# Patient Record
Sex: FEMALE | Race: WHITE | NOT HISPANIC OR LATINO | URBAN - METROPOLITAN AREA
[De-identification: names, ages, dates, MRNs, and addresses within clinical notes are randomized per-mention and may not be internally consistent; named-entity substitution may affect disease eponyms.]

---

## 2017-01-31 ENCOUNTER — OUTPATIENT (OUTPATIENT)
Dept: OUTPATIENT SERVICES | Facility: HOSPITAL | Age: 27
LOS: 1 days | End: 2017-01-31
Payer: COMMERCIAL

## 2017-01-31 DIAGNOSIS — Z01.818 ENCOUNTER FOR OTHER PREPROCEDURAL EXAMINATION: ICD-10-CM

## 2017-01-31 DIAGNOSIS — O34.211 MATERNAL CARE FOR LOW TRANSVERSE SCAR FROM PREVIOUS CESAREAN DELIVERY: ICD-10-CM

## 2017-01-31 DIAGNOSIS — Z98.89 OTHER SPECIFIED POSTPROCEDURAL STATES: Chronic | ICD-10-CM

## 2017-01-31 LAB
ANTIBODY ID 1_1: SIGNIFICANT CHANGE UP
BLD GP AB SCN SERPL QL: POSITIVE — SIGNIFICANT CHANGE UP
HCT VFR BLD CALC: 38.5 % — SIGNIFICANT CHANGE UP (ref 34.5–45)
HGB BLD-MCNC: 12.9 G/DL — SIGNIFICANT CHANGE UP (ref 11.5–15.5)
MCHC RBC-ENTMCNC: 31 PG — SIGNIFICANT CHANGE UP (ref 27–34)
MCHC RBC-ENTMCNC: 33.5 GM/DL — SIGNIFICANT CHANGE UP (ref 32–36)
MCV RBC AUTO: 92.5 FL — SIGNIFICANT CHANGE UP (ref 80–100)
PLATELET # BLD AUTO: 198 K/UL — SIGNIFICANT CHANGE UP (ref 150–400)
RBC # BLD: 4.16 M/UL — SIGNIFICANT CHANGE UP (ref 3.8–5.2)
RBC # FLD: 14.1 % — SIGNIFICANT CHANGE UP (ref 10.3–14.5)
RH IG SCN BLD-IMP: NEGATIVE — SIGNIFICANT CHANGE UP
WBC # BLD: 8.93 K/UL — SIGNIFICANT CHANGE UP (ref 3.8–10.5)
WBC # FLD AUTO: 8.93 K/UL — SIGNIFICANT CHANGE UP (ref 3.8–10.5)

## 2017-01-31 PROCEDURE — 86850 RBC ANTIBODY SCREEN: CPT

## 2017-01-31 PROCEDURE — 86900 BLOOD TYPING SEROLOGIC ABO: CPT

## 2017-01-31 PROCEDURE — G0463: CPT

## 2017-01-31 PROCEDURE — 85027 COMPLETE CBC AUTOMATED: CPT

## 2017-01-31 PROCEDURE — 86901 BLOOD TYPING SEROLOGIC RH(D): CPT

## 2017-01-31 PROCEDURE — 86870 RBC ANTIBODY IDENTIFICATION: CPT

## 2017-01-31 PROCEDURE — 86077 PHYS BLOOD BANK SERV XMATCH: CPT

## 2017-01-31 RX ORDER — SODIUM CHLORIDE 9 MG/ML
1000 INJECTION, SOLUTION INTRAVENOUS
Qty: 0 | Refills: 0 | Status: DISCONTINUED | OUTPATIENT
Start: 2017-02-01 | End: 2017-02-04

## 2017-01-31 RX ORDER — METOCLOPRAMIDE HCL 10 MG
10 TABLET ORAL ONCE
Qty: 0 | Refills: 0 | Status: DISCONTINUED | OUTPATIENT
Start: 2017-02-01 | End: 2017-02-04

## 2017-02-01 ENCOUNTER — TRANSCRIPTION ENCOUNTER (OUTPATIENT)
Age: 27
End: 2017-02-01

## 2017-02-01 ENCOUNTER — INPATIENT (INPATIENT)
Facility: HOSPITAL | Age: 27
LOS: 2 days | Discharge: HOME | End: 2017-02-04
Payer: COMMERCIAL

## 2017-02-01 VITALS — HEIGHT: 63 IN | WEIGHT: 158.73 LBS

## 2017-02-01 DIAGNOSIS — O34.211 MATERNAL CARE FOR LOW TRANSVERSE SCAR FROM PREVIOUS CESAREAN DELIVERY: ICD-10-CM

## 2017-02-01 DIAGNOSIS — Z01.818 ENCOUNTER FOR OTHER PREPROCEDURAL EXAMINATION: ICD-10-CM

## 2017-02-01 DIAGNOSIS — Z98.89 OTHER SPECIFIED POSTPROCEDURAL STATES: Chronic | ICD-10-CM

## 2017-02-01 LAB — T PALLIDUM AB TITR SER: NEGATIVE — SIGNIFICANT CHANGE UP

## 2017-02-01 RX ORDER — SODIUM CHLORIDE 9 MG/ML
1000 INJECTION, SOLUTION INTRAVENOUS ONCE
Qty: 0 | Refills: 0 | Status: COMPLETED | OUTPATIENT
Start: 2017-02-01 | End: 2017-02-01

## 2017-02-01 RX ORDER — CEFAZOLIN SODIUM 1 G
2000 VIAL (EA) INJECTION ONCE
Qty: 0 | Refills: 0 | Status: COMPLETED | OUTPATIENT
Start: 2017-02-01 | End: 2017-02-01

## 2017-02-01 RX ORDER — IBUPROFEN 200 MG
600 TABLET ORAL EVERY 6 HOURS
Qty: 0 | Refills: 0 | Status: DISCONTINUED | OUTPATIENT
Start: 2017-02-03 | End: 2017-02-04

## 2017-02-01 RX ORDER — ACETAMINOPHEN 500 MG
975 TABLET ORAL EVERY 6 HOURS
Qty: 0 | Refills: 0 | Status: DISCONTINUED | OUTPATIENT
Start: 2017-02-01 | End: 2017-02-04

## 2017-02-01 RX ORDER — HEPARIN SODIUM 5000 [USP'U]/ML
5000 INJECTION INTRAVENOUS; SUBCUTANEOUS EVERY 12 HOURS
Qty: 0 | Refills: 0 | Status: DISCONTINUED | OUTPATIENT
Start: 2017-02-01 | End: 2017-02-04

## 2017-02-01 RX ORDER — ONDANSETRON 8 MG/1
4 TABLET, FILM COATED ORAL EVERY 6 HOURS
Qty: 0 | Refills: 0 | Status: DISCONTINUED | OUTPATIENT
Start: 2017-02-01 | End: 2017-02-03

## 2017-02-01 RX ORDER — GLYCERIN ADULT
1 SUPPOSITORY, RECTAL RECTAL AT BEDTIME
Qty: 0 | Refills: 0 | Status: DISCONTINUED | OUTPATIENT
Start: 2017-02-01 | End: 2017-02-04

## 2017-02-01 RX ORDER — CITRIC ACID/SODIUM CITRATE 300-500 MG
15 SOLUTION, ORAL ORAL ONCE
Qty: 0 | Refills: 0 | Status: COMPLETED | OUTPATIENT
Start: 2017-02-01 | End: 2017-02-01

## 2017-02-01 RX ORDER — FERROUS SULFATE 325(65) MG
325 TABLET ORAL DAILY
Qty: 0 | Refills: 0 | Status: DISCONTINUED | OUTPATIENT
Start: 2017-02-01 | End: 2017-02-04

## 2017-02-01 RX ORDER — TETANUS TOXOID, REDUCED DIPHTHERIA TOXOID AND ACELLULAR PERTUSSIS VACCINE, ADSORBED 5; 2.5; 8; 8; 2.5 [IU]/.5ML; [IU]/.5ML; UG/.5ML; UG/.5ML; UG/.5ML
0.5 SUSPENSION INTRAMUSCULAR ONCE
Qty: 0 | Refills: 0 | Status: DISCONTINUED | OUTPATIENT
Start: 2017-02-01 | End: 2017-02-04

## 2017-02-01 RX ORDER — NALOXONE HYDROCHLORIDE 4 MG/.1ML
0.1 SPRAY NASAL
Qty: 0 | Refills: 0 | Status: DISCONTINUED | OUTPATIENT
Start: 2017-02-01 | End: 2017-02-03

## 2017-02-01 RX ORDER — SODIUM CHLORIDE 9 MG/ML
1000 INJECTION, SOLUTION INTRAVENOUS
Qty: 0 | Refills: 0 | Status: DISCONTINUED | OUTPATIENT
Start: 2017-02-01 | End: 2017-02-04

## 2017-02-01 RX ORDER — OXYTOCIN 10 UNIT/ML
41.67 VIAL (ML) INJECTION
Qty: 20 | Refills: 0 | Status: DISCONTINUED | OUTPATIENT
Start: 2017-02-01 | End: 2017-02-01

## 2017-02-01 RX ORDER — FAMOTIDINE 10 MG/ML
20 INJECTION INTRAVENOUS ONCE
Qty: 0 | Refills: 0 | Status: COMPLETED | OUTPATIENT
Start: 2017-02-01 | End: 2017-02-01

## 2017-02-01 RX ORDER — DIPHENHYDRAMINE HCL 50 MG
25 CAPSULE ORAL EVERY 6 HOURS
Qty: 0 | Refills: 0 | Status: DISCONTINUED | OUTPATIENT
Start: 2017-02-01 | End: 2017-02-04

## 2017-02-01 RX ORDER — SIMETHICONE 80 MG/1
80 TABLET, CHEWABLE ORAL EVERY 4 HOURS
Qty: 0 | Refills: 0 | Status: DISCONTINUED | OUTPATIENT
Start: 2017-02-01 | End: 2017-02-04

## 2017-02-01 RX ORDER — OXYTOCIN 10 UNIT/ML
333.33 VIAL (ML) INJECTION
Qty: 20 | Refills: 0 | Status: DISCONTINUED | OUTPATIENT
Start: 2017-02-01 | End: 2017-02-02

## 2017-02-01 RX ORDER — KETOROLAC TROMETHAMINE 30 MG/ML
30 SYRINGE (ML) INJECTION EVERY 6 HOURS
Qty: 0 | Refills: 0 | Status: DISCONTINUED | OUTPATIENT
Start: 2017-02-01 | End: 2017-02-04

## 2017-02-01 RX ORDER — LANOLIN
1 OINTMENT (GRAM) TOPICAL
Qty: 0 | Refills: 0 | Status: DISCONTINUED | OUTPATIENT
Start: 2017-02-01 | End: 2017-02-04

## 2017-02-01 RX ORDER — DOCUSATE SODIUM 100 MG
100 CAPSULE ORAL
Qty: 0 | Refills: 0 | Status: DISCONTINUED | OUTPATIENT
Start: 2017-02-01 | End: 2017-02-04

## 2017-02-01 RX ADMIN — Medication 100 MILLIGRAM(S): at 12:46

## 2017-02-01 RX ADMIN — SODIUM CHLORIDE 2000 MILLILITER(S): 9 INJECTION, SOLUTION INTRAVENOUS at 10:50

## 2017-02-01 RX ADMIN — FAMOTIDINE 20 MILLIGRAM(S): 10 INJECTION INTRAVENOUS at 11:21

## 2017-02-01 RX ADMIN — Medication 15 MILLILITER(S): at 11:21

## 2017-02-01 RX ADMIN — Medication 30 MILLIGRAM(S): at 13:39

## 2017-02-01 RX ADMIN — HEPARIN SODIUM 5000 UNIT(S): 5000 INJECTION INTRAVENOUS; SUBCUTANEOUS at 20:34

## 2017-02-01 RX ADMIN — SODIUM CHLORIDE 125 MILLILITER(S): 9 INJECTION, SOLUTION INTRAVENOUS at 14:27

## 2017-02-01 RX ADMIN — Medication 1000 MILLIUNIT(S)/MIN: at 14:30

## 2017-02-01 RX ADMIN — SIMETHICONE 80 MILLIGRAM(S): 80 TABLET, CHEWABLE ORAL at 20:44

## 2017-02-01 NOTE — DISCHARGE NOTE OB - MEDICATION SUMMARY - MEDICATIONS TO STOP TAKING
I will STOP taking the medications listed below when I get home from the hospital:    folic acid 1 mg oral tablet  -- 1 tab(s) by mouth once a day    Pepcid 20 mg oral tablet  -- 1 tab(s) by mouth 2 times a day, As Needed

## 2017-02-01 NOTE — DISCHARGE NOTE OB - CARE PROVIDER_API CALL
Tiffanie Hernadez), Obstetrics and Gynecology  [address] [place] [address]  Phone: [phone]  Fax: [phone]

## 2017-02-01 NOTE — DISCHARGE NOTE OB - HOSPITAL COURSE
27 y/o W/F  at 39 weeks with h/o previous cesearn section for elective repeat cesearn. Pt had  birth at 27+ weeks with her last child. She was taking Mekena IM q week for 16-35 weeks.  At ~27 weeks she had some leakage of fluid and was admitted for severa ldays. Leakage stopped and sonos were normal.  pt was delivered of a viable baby boy, apgars 9/9 and wt 7'2" 27 y/o W/F  at 39 weeks with h/o previous cesearn section for elective repeat cesearn. Pt had  birth at 27+ weeks with her last child. She was taking Mekena IM q week for 16-35 weeks.  At ~27 weeks she had some leakage of fluid and was admitted for severa ldays. Leakage stopped and sonos were normal.  pt was delivered of a viable baby boy, apgars 9/9 and wt 7'2"    pt has had a benign postop course

## 2017-02-01 NOTE — DISCHARGE NOTE OB - PLAN OF CARE
Safe delivery of patient Nothing in the vagina, no tampons, douches, baths, swimming or sex for 6-8 weeks.  Regular diet, follow up visit in 3 weeks Nothing in the vagina, no tampons, douches, baths, swimming or sex for 6-8 weeks.  Regular diet, follow up visit in 1 week

## 2017-02-01 NOTE — DISCHARGE NOTE OB - CARE PLAN
Principal Discharge DX:	39 weeks gestation of pregnancy  Goal:	Safe delivery of patient  Instructions for follow-up, activity and diet:	Nothing in the vagina, no tampons, douches, baths, swimming or sex for 6-8 weeks.  Regular diet, follow up visit in 3 weeks  Secondary Diagnosis:	Previous  delivery, delivered Principal Discharge DX:	39 weeks gestation of pregnancy  Goal:	Safe delivery of patient  Instructions for follow-up, activity and diet:	Nothing in the vagina, no tampons, douches, baths, swimming or sex for 6-8 weeks.  Regular diet, follow up visit in 1 week  Secondary Diagnosis:	Previous  delivery, delivered

## 2017-02-01 NOTE — DISCHARGE NOTE OB - MATERIALS PROVIDED
Back To Sleep Handout/Breastfeeding Guide and Packet/Birth Certificate Instructions/Breastfeeding Mother’s Support Group Information/[facility] [place] Screening Program/Shaken Baby Prevention Handout/Vaccinations/Discharge Medication Information for Patients and Families Pocket Guide/Breastfeeding Log/[facility] Hearing Screen Program/  Immunization Record

## 2017-02-01 NOTE — DISCHARGE NOTE OB - MEDICATION SUMMARY - MEDICATIONS TO TAKE
I will START or STAY ON the medications listed below when I get home from the hospital:    acetaminophen 325 mg oral tablet  -- 3 tab(s) by mouth every 6 hours  -- Indication: For Pain    ibuprofen 600 mg oral tablet  -- 1 tab(s) by mouth every 6 hours, As needed, Mild pain or headache  -- Indication: For Pain    oxyCODONE 5 mg oral tablet  -- 1 tab(s) by mouth every 6 hours, As Needed MDD:4 tads/d  -- Indication: For sever pain    magnesium hydroxide 8% oral suspension  -- 30 milliliter(s) by mouth once a day, As needed, Constipation  -- Indication: For constipation    lanolin topical ointment  -- 1 application on skin every 3 hours, As needed, Sore Nipples  -- Indication: For sore nipples    Prenatal Multivitamins with Folic Acid 1 mg oral tablet  -- 1 tab(s) by mouth once a day  -- Indication: For vitamin    docusate sodium 100 mg oral capsule  -- 1 cap(s) by mouth 2 times a day, As needed, Stool Softening  -- Indication: For stool softner    Probiotic Formula oral capsule  -- 1 cap(s) by mouth once a day  -- Indication: For Probiotic

## 2017-02-01 NOTE — DISCHARGE NOTE OB - PATIENT PORTAL LINK FT
“You can access the Follow[facility] Patient Portal, offered by [facility], by registering with the following website: [email or web address]”

## 2017-02-02 LAB
HCT VFR BLD CALC: 35.1 % — SIGNIFICANT CHANGE UP (ref 34.5–45)
HGB BLD-MCNC: 11.8 G/DL — SIGNIFICANT CHANGE UP (ref 11.5–15.5)
KLEIHAUER-BETKE CALCULATION: 0 % — SIGNIFICANT CHANGE UP (ref 0–0.3)
MCHC RBC-ENTMCNC: 31.1 PG — SIGNIFICANT CHANGE UP (ref 27–34)
MCHC RBC-ENTMCNC: 33.6 GM/DL — SIGNIFICANT CHANGE UP (ref 32–36)
MCV RBC AUTO: 92.6 FL — SIGNIFICANT CHANGE UP (ref 80–100)
PLATELET # BLD AUTO: 176 K/UL — SIGNIFICANT CHANGE UP (ref 150–400)
RBC # BLD: 3.79 M/UL — LOW (ref 3.8–5.2)
RBC # FLD: 14 % — SIGNIFICANT CHANGE UP (ref 10.3–14.5)
WBC # BLD: 11.58 K/UL — HIGH (ref 3.8–10.5)
WBC # FLD AUTO: 11.58 K/UL — HIGH (ref 3.8–10.5)

## 2017-02-02 RX ORDER — MAGNESIUM HYDROXIDE 400 MG/1
30 TABLET, CHEWABLE ORAL DAILY
Qty: 0 | Refills: 0 | Status: DISCONTINUED | OUTPATIENT
Start: 2017-02-02 | End: 2017-02-04

## 2017-02-02 RX ADMIN — SIMETHICONE 80 MILLIGRAM(S): 80 TABLET, CHEWABLE ORAL at 21:46

## 2017-02-02 RX ADMIN — Medication 1 ENEMA: at 21:45

## 2017-02-02 RX ADMIN — SIMETHICONE 80 MILLIGRAM(S): 80 TABLET, CHEWABLE ORAL at 17:30

## 2017-02-02 RX ADMIN — MAGNESIUM HYDROXIDE 30 MILLILITER(S): 400 TABLET, CHEWABLE ORAL at 13:01

## 2017-02-02 RX ADMIN — HEPARIN SODIUM 5000 UNIT(S): 5000 INJECTION INTRAVENOUS; SUBCUTANEOUS at 08:39

## 2017-02-02 RX ADMIN — SIMETHICONE 80 MILLIGRAM(S): 80 TABLET, CHEWABLE ORAL at 13:01

## 2017-02-02 RX ADMIN — SODIUM CHLORIDE 125 MILLILITER(S): 9 INJECTION, SOLUTION INTRAVENOUS at 20:00

## 2017-02-02 RX ADMIN — Medication 30 MILLIGRAM(S): at 09:41

## 2017-02-02 RX ADMIN — SODIUM CHLORIDE 125 MILLILITER(S): 9 INJECTION, SOLUTION INTRAVENOUS at 13:08

## 2017-02-02 RX ADMIN — HEPARIN SODIUM 5000 UNIT(S): 5000 INJECTION INTRAVENOUS; SUBCUTANEOUS at 20:00

## 2017-02-02 RX ADMIN — Medication 100 MILLIGRAM(S): at 08:39

## 2017-02-02 RX ADMIN — Medication 1 TABLET(S): at 13:01

## 2017-02-02 RX ADMIN — Medication 30 MILLIGRAM(S): at 10:18

## 2017-02-02 RX ADMIN — SIMETHICONE 80 MILLIGRAM(S): 80 TABLET, CHEWABLE ORAL at 08:39

## 2017-02-02 RX ADMIN — SIMETHICONE 80 MILLIGRAM(S): 80 TABLET, CHEWABLE ORAL at 01:01

## 2017-02-02 NOTE — PROVIDER CONTACT NOTE (MEDICATION) - ASSESSMENT
VSS, pt in no apparent distress. PCEA wdl, gives her adequate pain relief and is refusing toradol and tylenol at this time. Education provided for pain analgesics. Pt ambulatory ad fannie, walking in hallway. Taking mylicon for gas pain. PAS on while in bed, heparin initiated.

## 2017-02-02 NOTE — PROVIDER CONTACT NOTE (MEDICATION) - SITUATION
Pt repeat , delivered  at 1309. Pt with PCEA pump and adequate pain relief without toradol and tylenol.

## 2017-02-03 RX ORDER — OXYCODONE HYDROCHLORIDE 5 MG/1
5 TABLET ORAL EVERY 4 HOURS
Qty: 0 | Refills: 0 | Status: DISCONTINUED | OUTPATIENT
Start: 2017-02-03 | End: 2017-02-04

## 2017-02-03 RX ORDER — OXYTOCIN 10 UNIT/ML
41.67 VIAL (ML) INJECTION
Qty: 20 | Refills: 0 | Status: DISCONTINUED | OUTPATIENT
Start: 2017-02-03 | End: 2017-02-04

## 2017-02-03 RX ORDER — OXYCODONE HYDROCHLORIDE 5 MG/1
5 TABLET ORAL
Qty: 0 | Refills: 0 | Status: DISCONTINUED | OUTPATIENT
Start: 2017-02-03 | End: 2017-02-04

## 2017-02-03 RX ADMIN — HEPARIN SODIUM 5000 UNIT(S): 5000 INJECTION INTRAVENOUS; SUBCUTANEOUS at 09:20

## 2017-02-03 RX ADMIN — HEPARIN SODIUM 5000 UNIT(S): 5000 INJECTION INTRAVENOUS; SUBCUTANEOUS at 20:23

## 2017-02-03 RX ADMIN — Medication 100 MILLIGRAM(S): at 15:03

## 2017-02-03 RX ADMIN — Medication 1 TABLET(S): at 12:40

## 2017-02-03 RX ADMIN — Medication 600 MILLIGRAM(S): at 22:45

## 2017-02-03 RX ADMIN — Medication 600 MILLIGRAM(S): at 15:02

## 2017-02-03 RX ADMIN — Medication 600 MILLIGRAM(S): at 21:53

## 2017-02-03 RX ADMIN — Medication 30 MILLIGRAM(S): at 05:37

## 2017-02-03 RX ADMIN — SIMETHICONE 80 MILLIGRAM(S): 80 TABLET, CHEWABLE ORAL at 05:36

## 2017-02-03 RX ADMIN — Medication 975 MILLIGRAM(S): at 17:50

## 2017-02-04 VITALS
HEART RATE: 80 BPM | RESPIRATION RATE: 18 BRPM | TEMPERATURE: 98 F | SYSTOLIC BLOOD PRESSURE: 119 MMHG | DIASTOLIC BLOOD PRESSURE: 77 MMHG | OXYGEN SATURATION: 98 %

## 2017-02-04 LAB
HCT VFR BLD CALC: 34 % — LOW (ref 34.5–45)
HGB BLD-MCNC: 11.3 G/DL — LOW (ref 11.5–15.5)
MCHC RBC-ENTMCNC: 31.2 PG — SIGNIFICANT CHANGE UP (ref 27–34)
MCHC RBC-ENTMCNC: 33.3 GM/DL — SIGNIFICANT CHANGE UP (ref 32–36)
MCV RBC AUTO: 93.6 FL — SIGNIFICANT CHANGE UP (ref 80–100)
PLATELET # BLD AUTO: 178 K/UL — SIGNIFICANT CHANGE UP (ref 150–400)
RBC # BLD: 3.63 M/UL — LOW (ref 3.8–5.2)
RBC # FLD: 13.4 % — SIGNIFICANT CHANGE UP (ref 10.3–14.5)
WBC # BLD: 9 K/UL — SIGNIFICANT CHANGE UP (ref 3.8–10.5)
WBC # FLD AUTO: 9 K/UL — SIGNIFICANT CHANGE UP (ref 3.8–10.5)

## 2017-02-04 PROCEDURE — 85460 HEMOGLOBIN FETAL: CPT

## 2017-02-04 PROCEDURE — 85027 COMPLETE CBC AUTOMATED: CPT

## 2017-02-04 PROCEDURE — 59025 FETAL NON-STRESS TEST: CPT

## 2017-02-04 PROCEDURE — C1765: CPT

## 2017-02-04 PROCEDURE — 86780 TREPONEMA PALLIDUM: CPT

## 2017-02-04 PROCEDURE — 59050 FETAL MONITOR W/REPORT: CPT

## 2017-02-04 RX ORDER — ACETAMINOPHEN 500 MG
3 TABLET ORAL
Qty: 0 | Refills: 0 | DISCHARGE
Start: 2017-02-04

## 2017-02-04 RX ORDER — MAGNESIUM HYDROXIDE 400 MG/1
30 TABLET, CHEWABLE ORAL
Qty: 0 | Refills: 0 | DISCHARGE
Start: 2017-02-04

## 2017-02-04 RX ORDER — DOCUSATE SODIUM 100 MG
1 CAPSULE ORAL
Qty: 0 | Refills: 0 | DISCHARGE
Start: 2017-02-04

## 2017-02-04 RX ORDER — LANOLIN
1 OINTMENT (GRAM) TOPICAL
Qty: 0 | Refills: 0 | DISCHARGE
Start: 2017-02-04

## 2017-02-04 RX ORDER — OXYCODONE HYDROCHLORIDE 5 MG/1
1 TABLET ORAL
Qty: 12 | Refills: 0
Start: 2017-02-04 | End: 2017-02-07

## 2017-02-04 RX ORDER — IBUPROFEN 200 MG
1 TABLET ORAL
Qty: 0 | Refills: 0 | DISCHARGE
Start: 2017-02-04

## 2017-02-04 RX ORDER — FOLIC ACID 0.8 MG
1 TABLET ORAL
Qty: 0 | Refills: 0 | COMMUNITY

## 2017-02-04 RX ORDER — FAMOTIDINE 10 MG/ML
1 INJECTION INTRAVENOUS
Qty: 0 | Refills: 0 | COMMUNITY

## 2017-02-04 RX ADMIN — Medication 600 MILLIGRAM(S): at 08:57

## 2017-02-04 RX ADMIN — HEPARIN SODIUM 5000 UNIT(S): 5000 INJECTION INTRAVENOUS; SUBCUTANEOUS at 08:57

## 2017-02-04 RX ADMIN — Medication 975 MILLIGRAM(S): at 05:58

## 2017-02-04 RX ADMIN — Medication 600 MILLIGRAM(S): at 09:30

## 2017-02-04 RX ADMIN — Medication 1 TABLET(S): at 11:29

## 2018-09-08 ENCOUNTER — EMERGENCY (EMERGENCY)
Facility: HOSPITAL | Age: 28
LOS: 1 days | Discharge: HOME | End: 2018-09-08
Attending: EMERGENCY MEDICINE
Payer: COMMERCIAL

## 2018-09-08 VITALS
OXYGEN SATURATION: 99 % | WEIGHT: 145.06 LBS | HEART RATE: 94 BPM | RESPIRATION RATE: 16 BRPM | TEMPERATURE: 98 F | SYSTOLIC BLOOD PRESSURE: 143 MMHG | HEIGHT: 63 IN | DIASTOLIC BLOOD PRESSURE: 88 MMHG

## 2018-09-08 DIAGNOSIS — Z98.89 OTHER SPECIFIED POSTPROCEDURAL STATES: Chronic | ICD-10-CM

## 2018-09-08 LAB
ALBUMIN SERPL ELPH-MCNC: 4.6 G/DL — SIGNIFICANT CHANGE UP (ref 3.3–5.2)
ALP SERPL-CCNC: 64 U/L — SIGNIFICANT CHANGE UP (ref 40–120)
ALT FLD-CCNC: 13 U/L — SIGNIFICANT CHANGE UP
ANION GAP SERPL CALC-SCNC: 14 MMOL/L — SIGNIFICANT CHANGE UP (ref 5–17)
APPEARANCE UR: CLEAR — SIGNIFICANT CHANGE UP
AST SERPL-CCNC: 21 U/L — SIGNIFICANT CHANGE UP
BASOPHILS # BLD AUTO: 0 K/UL — SIGNIFICANT CHANGE UP (ref 0–0.2)
BASOPHILS NFR BLD AUTO: 0.4 % — SIGNIFICANT CHANGE UP (ref 0–2)
BILIRUB SERPL-MCNC: 0.4 MG/DL — SIGNIFICANT CHANGE UP (ref 0.4–2)
BILIRUB UR-MCNC: NEGATIVE — SIGNIFICANT CHANGE UP
BUN SERPL-MCNC: 15 MG/DL — SIGNIFICANT CHANGE UP (ref 8–20)
CALCIUM SERPL-MCNC: 9.6 MG/DL — SIGNIFICANT CHANGE UP (ref 8.6–10.2)
CHLORIDE SERPL-SCNC: 103 MMOL/L — SIGNIFICANT CHANGE UP (ref 98–107)
CO2 SERPL-SCNC: 22 MMOL/L — SIGNIFICANT CHANGE UP (ref 22–29)
COLOR SPEC: YELLOW — SIGNIFICANT CHANGE UP
CREAT SERPL-MCNC: 0.49 MG/DL — LOW (ref 0.5–1.3)
D DIMER BLD IA.RAPID-MCNC: 296 NG/ML DDU — HIGH
DIFF PNL FLD: NEGATIVE — SIGNIFICANT CHANGE UP
EOSINOPHIL # BLD AUTO: 0.2 K/UL — SIGNIFICANT CHANGE UP (ref 0–0.5)
EOSINOPHIL NFR BLD AUTO: 1.6 % — SIGNIFICANT CHANGE UP (ref 0–6)
GLUCOSE SERPL-MCNC: 97 MG/DL — SIGNIFICANT CHANGE UP (ref 70–115)
GLUCOSE UR QL: NEGATIVE MG/DL — SIGNIFICANT CHANGE UP
HCG UR QL: NEGATIVE — SIGNIFICANT CHANGE UP
HCT VFR BLD CALC: 42.4 % — SIGNIFICANT CHANGE UP (ref 37–47)
HGB BLD-MCNC: 14.5 G/DL — SIGNIFICANT CHANGE UP (ref 12–16)
KETONES UR-MCNC: NEGATIVE — SIGNIFICANT CHANGE UP
LEUKOCYTE ESTERASE UR-ACNC: NEGATIVE — SIGNIFICANT CHANGE UP
LYMPHOCYTES # BLD AUTO: 2.5 K/UL — SIGNIFICANT CHANGE UP (ref 1–4.8)
LYMPHOCYTES # BLD AUTO: 24.1 % — SIGNIFICANT CHANGE UP (ref 20–55)
MCHC RBC-ENTMCNC: 29.8 PG — SIGNIFICANT CHANGE UP (ref 27–31)
MCHC RBC-ENTMCNC: 34.2 G/DL — SIGNIFICANT CHANGE UP (ref 32–36)
MCV RBC AUTO: 87.2 FL — SIGNIFICANT CHANGE UP (ref 81–99)
MONOCYTES # BLD AUTO: 0.6 K/UL — SIGNIFICANT CHANGE UP (ref 0–0.8)
MONOCYTES NFR BLD AUTO: 6.2 % — SIGNIFICANT CHANGE UP (ref 3–10)
NEUTROPHILS # BLD AUTO: 7.1 K/UL — SIGNIFICANT CHANGE UP (ref 1.8–8)
NEUTROPHILS NFR BLD AUTO: 67.6 % — SIGNIFICANT CHANGE UP (ref 37–73)
NITRITE UR-MCNC: NEGATIVE — SIGNIFICANT CHANGE UP
PH UR: 6 — SIGNIFICANT CHANGE UP (ref 5–8)
PLATELET # BLD AUTO: 312 K/UL — SIGNIFICANT CHANGE UP (ref 150–400)
POTASSIUM SERPL-MCNC: 4 MMOL/L — SIGNIFICANT CHANGE UP (ref 3.5–5.3)
POTASSIUM SERPL-SCNC: 4 MMOL/L — SIGNIFICANT CHANGE UP (ref 3.5–5.3)
PROT SERPL-MCNC: 7.9 G/DL — SIGNIFICANT CHANGE UP (ref 6.6–8.7)
PROT UR-MCNC: NEGATIVE MG/DL — SIGNIFICANT CHANGE UP
RBC # BLD: 4.86 M/UL — SIGNIFICANT CHANGE UP (ref 4.4–5.2)
RBC # FLD: 13 % — SIGNIFICANT CHANGE UP (ref 11–15.6)
SODIUM SERPL-SCNC: 139 MMOL/L — SIGNIFICANT CHANGE UP (ref 135–145)
SP GR SPEC: 1.01 — SIGNIFICANT CHANGE UP (ref 1.01–1.02)
UROBILINOGEN FLD QL: NEGATIVE MG/DL — SIGNIFICANT CHANGE UP
WBC # BLD: 10.5 K/UL — SIGNIFICANT CHANGE UP (ref 4.8–10.8)
WBC # FLD AUTO: 10.5 K/UL — SIGNIFICANT CHANGE UP (ref 4.8–10.8)

## 2018-09-08 PROCEDURE — 99284 EMERGENCY DEPT VISIT MOD MDM: CPT

## 2018-09-08 PROCEDURE — 81025 URINE PREGNANCY TEST: CPT

## 2018-09-08 PROCEDURE — 81003 URINALYSIS AUTO W/O SCOPE: CPT

## 2018-09-08 PROCEDURE — 99284 EMERGENCY DEPT VISIT MOD MDM: CPT | Mod: 25

## 2018-09-08 PROCEDURE — 76705 ECHO EXAM OF ABDOMEN: CPT | Mod: 26

## 2018-09-08 PROCEDURE — 71275 CT ANGIOGRAPHY CHEST: CPT

## 2018-09-08 PROCEDURE — 80053 COMPREHEN METABOLIC PANEL: CPT

## 2018-09-08 PROCEDURE — 71275 CT ANGIOGRAPHY CHEST: CPT | Mod: 26

## 2018-09-08 PROCEDURE — 76705 ECHO EXAM OF ABDOMEN: CPT

## 2018-09-08 PROCEDURE — 85027 COMPLETE CBC AUTOMATED: CPT

## 2018-09-08 PROCEDURE — 36415 COLL VENOUS BLD VENIPUNCTURE: CPT

## 2018-09-08 PROCEDURE — 85379 FIBRIN DEGRADATION QUANT: CPT

## 2018-09-08 NOTE — ED PROVIDER NOTE - OBJECTIVE STATEMENT
27 y/o with right sided chest pain worse with deep breaths   no fever no n/vd, x several days   no fever

## 2018-09-08 NOTE — ED ADULT TRIAGE NOTE - CHIEF COMPLAINT QUOTE
pt comes to ed from home for reports of right shoulder pain and right upper quadrant pain x 1 week with intermittent nausea x 2-3 weeks.

## 2018-09-08 NOTE — ED ADULT NURSE NOTE - OBJECTIVE STATEMENT
27 y/o female presents to the ed c/o right shoulder ruthie nwith radiation to right chest and RUQ. intermittent nausea. also reports sob on exertion denies fever chills headache epigastric abdominal pain and urinary problems

## 2018-09-08 NOTE — ED ADULT NURSE NOTE - NSIMPLEMENTINTERV_GEN_ALL_ED
Implemented All Universal Safety Interventions:  [place] to call system. Call bell, personal items and telephone within reach. Instruct patient to call for assistance. Room bathroom lighting operational. Non-slip footwear when patient is off stretcher. Physically safe environment: no spills, clutter or unnecessary equipment. Stretcher in lowest position, wheels locked, appropriate side rails in place.

## 2019-02-19 ENCOUNTER — ASOB RESULT (OUTPATIENT)
Age: 29
End: 2019-02-19

## 2019-02-19 ENCOUNTER — APPOINTMENT (OUTPATIENT)
Dept: ANTEPARTUM | Facility: CLINIC | Age: 29
End: 2019-02-19
Payer: COMMERCIAL

## 2019-02-19 PROCEDURE — 36416 COLLJ CAPILLARY BLOOD SPEC: CPT

## 2019-02-19 PROCEDURE — 76813 OB US NUCHAL MEAS 1 GEST: CPT

## 2019-02-19 PROCEDURE — 76801 OB US < 14 WKS SINGLE FETUS: CPT

## 2019-03-19 ENCOUNTER — APPOINTMENT (OUTPATIENT)
Dept: ANTEPARTUM | Facility: CLINIC | Age: 29
End: 2019-03-19
Payer: COMMERCIAL

## 2019-03-19 ENCOUNTER — ASOB RESULT (OUTPATIENT)
Age: 29
End: 2019-03-19

## 2019-03-19 PROCEDURE — 76805 OB US >/= 14 WKS SNGL FETUS: CPT

## 2019-03-19 PROCEDURE — 76817 TRANSVAGINAL US OBSTETRIC: CPT

## 2019-04-15 ENCOUNTER — APPOINTMENT (OUTPATIENT)
Dept: ANTEPARTUM | Facility: CLINIC | Age: 29
End: 2019-04-15
Payer: COMMERCIAL

## 2019-04-15 ENCOUNTER — ASOB RESULT (OUTPATIENT)
Age: 29
End: 2019-04-15

## 2019-04-15 PROCEDURE — 76811 OB US DETAILED SNGL FETUS: CPT

## 2019-05-07 ENCOUNTER — ASOB RESULT (OUTPATIENT)
Age: 29
End: 2019-05-07

## 2019-05-07 ENCOUNTER — APPOINTMENT (OUTPATIENT)
Dept: ANTEPARTUM | Facility: CLINIC | Age: 29
End: 2019-05-07
Payer: COMMERCIAL

## 2019-05-07 PROCEDURE — 76816 OB US FOLLOW-UP PER FETUS: CPT

## 2019-05-28 ENCOUNTER — ASOB RESULT (OUTPATIENT)
Age: 29
End: 2019-05-28

## 2019-05-28 ENCOUNTER — APPOINTMENT (OUTPATIENT)
Dept: ANTEPARTUM | Facility: CLINIC | Age: 29
End: 2019-05-28
Payer: COMMERCIAL

## 2019-05-28 PROCEDURE — 76817 TRANSVAGINAL US OBSTETRIC: CPT

## 2019-05-28 PROCEDURE — 76816 OB US FOLLOW-UP PER FETUS: CPT

## 2019-06-19 ENCOUNTER — ASOB RESULT (OUTPATIENT)
Age: 29
End: 2019-06-19

## 2019-06-19 ENCOUNTER — APPOINTMENT (OUTPATIENT)
Dept: ANTEPARTUM | Facility: CLINIC | Age: 29
End: 2019-06-19
Payer: COMMERCIAL

## 2019-06-19 PROCEDURE — 76816 OB US FOLLOW-UP PER FETUS: CPT

## 2019-07-24 ENCOUNTER — APPOINTMENT (OUTPATIENT)
Dept: ANTEPARTUM | Facility: CLINIC | Age: 29
End: 2019-07-24
Payer: COMMERCIAL

## 2019-07-24 ENCOUNTER — ASOB RESULT (OUTPATIENT)
Age: 29
End: 2019-07-24

## 2019-07-24 PROCEDURE — 76816 OB US FOLLOW-UP PER FETUS: CPT

## 2019-08-12 ENCOUNTER — OUTPATIENT (OUTPATIENT)
Dept: OUTPATIENT SERVICES | Facility: HOSPITAL | Age: 29
LOS: 1 days | End: 2019-08-12
Payer: COMMERCIAL

## 2019-08-12 DIAGNOSIS — Z34.80 ENCOUNTER FOR SUPERVISION OF OTHER NORMAL PREGNANCY, UNSPECIFIED TRIMESTER: ICD-10-CM

## 2019-08-12 DIAGNOSIS — Z98.89 OTHER SPECIFIED POSTPROCEDURAL STATES: Chronic | ICD-10-CM

## 2019-08-12 LAB
BLD GP AB SCN SERPL QL: POSITIVE — SIGNIFICANT CHANGE UP
HCT VFR BLD CALC: 37.5 % — SIGNIFICANT CHANGE UP (ref 34.5–45)
HGB BLD-MCNC: 12.7 G/DL — SIGNIFICANT CHANGE UP (ref 11.5–15.5)
MCHC RBC-ENTMCNC: 31.6 PG — SIGNIFICANT CHANGE UP (ref 27–34)
MCHC RBC-ENTMCNC: 33.9 GM/DL — SIGNIFICANT CHANGE UP (ref 32–36)
MCV RBC AUTO: 93.3 FL — SIGNIFICANT CHANGE UP (ref 80–100)
PLATELET # BLD AUTO: 197 K/UL — SIGNIFICANT CHANGE UP (ref 150–400)
RBC # BLD: 4.02 M/UL — SIGNIFICANT CHANGE UP (ref 3.8–5.2)
RBC # FLD: 13.5 % — SIGNIFICANT CHANGE UP (ref 10.3–14.5)
RH IG SCN BLD-IMP: NEGATIVE — SIGNIFICANT CHANGE UP
WBC # BLD: 10.85 K/UL — HIGH (ref 3.8–10.5)
WBC # FLD AUTO: 10.85 K/UL — HIGH (ref 3.8–10.5)

## 2019-08-12 PROCEDURE — 86900 BLOOD TYPING SEROLOGIC ABO: CPT

## 2019-08-12 PROCEDURE — 86850 RBC ANTIBODY SCREEN: CPT

## 2019-08-12 PROCEDURE — 86870 RBC ANTIBODY IDENTIFICATION: CPT

## 2019-08-12 PROCEDURE — 85027 COMPLETE CBC AUTOMATED: CPT

## 2019-08-12 PROCEDURE — G0463: CPT

## 2019-08-12 PROCEDURE — 86901 BLOOD TYPING SEROLOGIC RH(D): CPT

## 2019-08-12 RX ORDER — L.ACIDOPH/B.ANIMALIS/B.LONGUM 15B CELL
1 CAPSULE ORAL
Qty: 0 | Refills: 0 | DISCHARGE

## 2019-08-13 PROCEDURE — 86077 PHYS BLOOD BANK SERV XMATCH: CPT

## 2019-08-14 ENCOUNTER — APPOINTMENT (OUTPATIENT)
Dept: ANTEPARTUM | Facility: CLINIC | Age: 29
End: 2019-08-14

## 2019-08-24 ENCOUNTER — TRANSCRIPTION ENCOUNTER (OUTPATIENT)
Age: 29
End: 2019-08-24

## 2019-08-25 ENCOUNTER — INPATIENT (INPATIENT)
Facility: HOSPITAL | Age: 29
LOS: 2 days | Discharge: HOME | End: 2019-08-28
Payer: COMMERCIAL

## 2019-08-25 VITALS
SYSTOLIC BLOOD PRESSURE: 130 MMHG | RESPIRATION RATE: 18 BRPM | TEMPERATURE: 98 F | HEART RATE: 99 BPM | DIASTOLIC BLOOD PRESSURE: 86 MMHG | OXYGEN SATURATION: 98 %

## 2019-08-25 DIAGNOSIS — Z98.89 OTHER SPECIFIED POSTPROCEDURAL STATES: Chronic | ICD-10-CM

## 2019-08-25 DIAGNOSIS — Z34.80 ENCOUNTER FOR SUPERVISION OF OTHER NORMAL PREGNANCY, UNSPECIFIED TRIMESTER: ICD-10-CM

## 2019-08-25 DIAGNOSIS — O26.899 OTHER SPECIFIED PREGNANCY RELATED CONDITIONS, UNSPECIFIED TRIMESTER: ICD-10-CM

## 2019-08-25 DIAGNOSIS — Z3A.00 WEEKS OF GESTATION OF PREGNANCY NOT SPECIFIED: ICD-10-CM

## 2019-08-25 LAB
ANTIBODY ID 1_1: SIGNIFICANT CHANGE UP
BASOPHILS # BLD AUTO: 0 K/UL — SIGNIFICANT CHANGE UP (ref 0–0.2)
BASOPHILS NFR BLD AUTO: 0.4 % — SIGNIFICANT CHANGE UP (ref 0–2)
BLD GP AB SCN SERPL QL: POSITIVE — SIGNIFICANT CHANGE UP
EOSINOPHIL # BLD AUTO: 0.2 K/UL — SIGNIFICANT CHANGE UP (ref 0–0.5)
EOSINOPHIL NFR BLD AUTO: 2 % — SIGNIFICANT CHANGE UP (ref 0–6)
HCT VFR BLD CALC: 40.5 % — SIGNIFICANT CHANGE UP (ref 34.5–45)
HGB BLD-MCNC: 13.8 G/DL — SIGNIFICANT CHANGE UP (ref 11.5–15.5)
LYMPHOCYTES # BLD AUTO: 1.8 K/UL — SIGNIFICANT CHANGE UP (ref 1–3.3)
LYMPHOCYTES # BLD AUTO: 16.7 % — SIGNIFICANT CHANGE UP (ref 13–44)
MCHC RBC-ENTMCNC: 32 PG — SIGNIFICANT CHANGE UP (ref 27–34)
MCHC RBC-ENTMCNC: 34 GM/DL — SIGNIFICANT CHANGE UP (ref 32–36)
MCV RBC AUTO: 94.1 FL — SIGNIFICANT CHANGE UP (ref 80–100)
MONOCYTES # BLD AUTO: 0.6 K/UL — SIGNIFICANT CHANGE UP (ref 0–0.9)
MONOCYTES NFR BLD AUTO: 5.1 % — SIGNIFICANT CHANGE UP (ref 2–14)
NEUTROPHILS # BLD AUTO: 8.3 K/UL — HIGH (ref 1.8–7.4)
NEUTROPHILS NFR BLD AUTO: 75.7 % — SIGNIFICANT CHANGE UP (ref 43–77)
PLATELET # BLD AUTO: 222 K/UL — SIGNIFICANT CHANGE UP (ref 150–400)
RBC # BLD: 4.3 M/UL — SIGNIFICANT CHANGE UP (ref 3.8–5.2)
RBC # FLD: 12.4 % — SIGNIFICANT CHANGE UP (ref 10.3–14.5)
RH IG SCN BLD-IMP: NEGATIVE — SIGNIFICANT CHANGE UP
WBC # BLD: 11 K/UL — HIGH (ref 3.8–10.5)
WBC # FLD AUTO: 11 K/UL — HIGH (ref 3.8–10.5)

## 2019-08-25 PROCEDURE — 86077 PHYS BLOOD BANK SERV XMATCH: CPT

## 2019-08-25 PROCEDURE — 88302 TISSUE EXAM BY PATHOLOGIST: CPT | Mod: 26

## 2019-08-25 RX ORDER — SODIUM CHLORIDE 9 MG/ML
1000 INJECTION, SOLUTION INTRAVENOUS
Refills: 0 | Status: DISCONTINUED | OUTPATIENT
Start: 2019-08-25 | End: 2019-08-28

## 2019-08-25 RX ORDER — HEPARIN SODIUM 5000 [USP'U]/ML
5000 INJECTION INTRAVENOUS; SUBCUTANEOUS EVERY 12 HOURS
Refills: 0 | Status: DISCONTINUED | OUTPATIENT
Start: 2019-08-25 | End: 2019-08-28

## 2019-08-25 RX ORDER — DIPHENHYDRAMINE HCL 50 MG
25 CAPSULE ORAL EVERY 6 HOURS
Refills: 0 | Status: DISCONTINUED | OUTPATIENT
Start: 2019-08-25 | End: 2019-08-28

## 2019-08-25 RX ORDER — ACETAMINOPHEN 500 MG
975 TABLET ORAL
Refills: 0 | Status: DISCONTINUED | OUTPATIENT
Start: 2019-08-25 | End: 2019-08-28

## 2019-08-25 RX ORDER — CITRIC ACID/SODIUM CITRATE 300-500 MG
15 SOLUTION, ORAL ORAL EVERY 6 HOURS
Refills: 0 | Status: DISCONTINUED | OUTPATIENT
Start: 2019-08-25 | End: 2019-08-25

## 2019-08-25 RX ORDER — ONDANSETRON 8 MG/1
4 TABLET, FILM COATED ORAL EVERY 6 HOURS
Refills: 0 | Status: DISCONTINUED | OUTPATIENT
Start: 2019-08-25 | End: 2019-08-27

## 2019-08-25 RX ORDER — OXYTOCIN 10 UNIT/ML
333.33 VIAL (ML) INJECTION
Qty: 20 | Refills: 0 | Status: DISCONTINUED | OUTPATIENT
Start: 2019-08-25 | End: 2019-08-28

## 2019-08-25 RX ORDER — SIMETHICONE 80 MG/1
80 TABLET, CHEWABLE ORAL EVERY 4 HOURS
Refills: 0 | Status: DISCONTINUED | OUTPATIENT
Start: 2019-08-25 | End: 2019-08-28

## 2019-08-25 RX ORDER — GLYCERIN ADULT
1 SUPPOSITORY, RECTAL RECTAL AT BEDTIME
Refills: 0 | Status: DISCONTINUED | OUTPATIENT
Start: 2019-08-25 | End: 2019-08-28

## 2019-08-25 RX ORDER — FENTANYL/BUPIVACAINE/NS/PF 2MCG/ML-.1
5 PLASTIC BAG, INJECTION (ML) INJECTION
Refills: 0 | Status: DISCONTINUED | OUTPATIENT
Start: 2019-08-25 | End: 2019-08-27

## 2019-08-25 RX ORDER — OXYCODONE HYDROCHLORIDE 5 MG/1
5 TABLET ORAL ONCE
Refills: 0 | Status: DISCONTINUED | OUTPATIENT
Start: 2019-08-25 | End: 2019-08-28

## 2019-08-25 RX ORDER — OXYTOCIN 10 UNIT/ML
333.33 VIAL (ML) INJECTION
Qty: 20 | Refills: 0 | Status: DISCONTINUED | OUTPATIENT
Start: 2019-08-25 | End: 2019-08-25

## 2019-08-25 RX ORDER — KETOROLAC TROMETHAMINE 30 MG/ML
30 SYRINGE (ML) INJECTION EVERY 6 HOURS
Refills: 0 | Status: DISCONTINUED | OUTPATIENT
Start: 2019-08-25 | End: 2019-08-26

## 2019-08-25 RX ORDER — TETANUS TOXOID, REDUCED DIPHTHERIA TOXOID AND ACELLULAR PERTUSSIS VACCINE, ADSORBED 5; 2.5; 8; 8; 2.5 [IU]/.5ML; [IU]/.5ML; UG/.5ML; UG/.5ML; UG/.5ML
0.5 SUSPENSION INTRAMUSCULAR ONCE
Refills: 0 | Status: DISCONTINUED | OUTPATIENT
Start: 2019-08-25 | End: 2019-08-28

## 2019-08-25 RX ORDER — NALOXONE HYDROCHLORIDE 4 MG/.1ML
0.1 SPRAY NASAL
Refills: 0 | Status: DISCONTINUED | OUTPATIENT
Start: 2019-08-25 | End: 2019-08-27

## 2019-08-25 RX ORDER — LANOLIN
1 OINTMENT (GRAM) TOPICAL EVERY 6 HOURS
Refills: 0 | Status: DISCONTINUED | OUTPATIENT
Start: 2019-08-25 | End: 2019-08-28

## 2019-08-25 RX ORDER — IBUPROFEN 200 MG
600 TABLET ORAL EVERY 6 HOURS
Refills: 0 | Status: COMPLETED | OUTPATIENT
Start: 2019-08-25 | End: 2020-07-23

## 2019-08-25 RX ORDER — SODIUM CHLORIDE 9 MG/ML
1000 INJECTION, SOLUTION INTRAVENOUS
Refills: 0 | Status: DISCONTINUED | OUTPATIENT
Start: 2019-08-25 | End: 2019-08-25

## 2019-08-25 RX ORDER — OXYCODONE HYDROCHLORIDE 5 MG/1
5 TABLET ORAL
Refills: 0 | Status: COMPLETED | OUTPATIENT
Start: 2019-08-25 | End: 2019-09-01

## 2019-08-25 RX ORDER — MAGNESIUM HYDROXIDE 400 MG/1
30 TABLET, CHEWABLE ORAL
Refills: 0 | Status: DISCONTINUED | OUTPATIENT
Start: 2019-08-25 | End: 2019-08-28

## 2019-08-25 RX ORDER — DOCUSATE SODIUM 100 MG
100 CAPSULE ORAL
Refills: 0 | Status: DISCONTINUED | OUTPATIENT
Start: 2019-08-25 | End: 2019-08-28

## 2019-08-25 RX ADMIN — Medication 30 MILLIGRAM(S): at 13:00

## 2019-08-25 RX ADMIN — Medication 30 MILLIGRAM(S): at 05:30

## 2019-08-25 RX ADMIN — SIMETHICONE 80 MILLIGRAM(S): 80 TABLET, CHEWABLE ORAL at 18:45

## 2019-08-25 RX ADMIN — Medication 100 MILLIGRAM(S): at 18:45

## 2019-08-25 RX ADMIN — SODIUM CHLORIDE 125 MILLILITER(S): 9 INJECTION, SOLUTION INTRAVENOUS at 15:50

## 2019-08-25 RX ADMIN — Medication 30 MILLIGRAM(S): at 12:29

## 2019-08-25 RX ADMIN — Medication 30 MILLIGRAM(S): at 18:41

## 2019-08-25 RX ADMIN — Medication 30 MILLIGRAM(S): at 19:10

## 2019-08-25 RX ADMIN — HEPARIN SODIUM 5000 UNIT(S): 5000 INJECTION INTRAVENOUS; SUBCUTANEOUS at 12:30

## 2019-08-25 RX ADMIN — Medication 1000 MILLIUNIT(S)/MIN: at 05:45

## 2019-08-25 NOTE — PRE-ANESTHESIA EVALUATION ADULT - NSANTHOSAYNRD_GEN_A_CORE
No. CHEO screening performed.  STOP BANG Legend: 0-2 = LOW Risk; 3-4 = INTERMEDIATE Risk; 5-8 = HIGH Risk

## 2019-08-26 ENCOUNTER — TRANSCRIPTION ENCOUNTER (OUTPATIENT)
Age: 29
End: 2019-08-26

## 2019-08-26 LAB
BASOPHILS # BLD AUTO: 0.04 K/UL — SIGNIFICANT CHANGE UP (ref 0–0.2)
BASOPHILS NFR BLD AUTO: 0.4 % — SIGNIFICANT CHANGE UP (ref 0–2)
EOSINOPHIL # BLD AUTO: 0.11 K/UL — SIGNIFICANT CHANGE UP (ref 0–0.5)
EOSINOPHIL NFR BLD AUTO: 1.2 % — SIGNIFICANT CHANGE UP (ref 0–6)
HCT VFR BLD CALC: 34.8 % — SIGNIFICANT CHANGE UP (ref 34.5–45)
HGB BLD-MCNC: 11.2 G/DL — LOW (ref 11.5–15.5)
IMM GRANULOCYTES NFR BLD AUTO: 0.4 % — SIGNIFICANT CHANGE UP (ref 0–1.5)
KLEIHAUER-BETKE CALCULATION: 0 % — SIGNIFICANT CHANGE UP (ref 0–0.3)
LYMPHOCYTES # BLD AUTO: 1.84 K/UL — SIGNIFICANT CHANGE UP (ref 1–3.3)
LYMPHOCYTES # BLD AUTO: 20.4 % — SIGNIFICANT CHANGE UP (ref 13–44)
MCHC RBC-ENTMCNC: 30.8 PG — SIGNIFICANT CHANGE UP (ref 27–34)
MCHC RBC-ENTMCNC: 32.2 GM/DL — SIGNIFICANT CHANGE UP (ref 32–36)
MCV RBC AUTO: 95.6 FL — SIGNIFICANT CHANGE UP (ref 80–100)
MONOCYTES # BLD AUTO: 0.6 K/UL — SIGNIFICANT CHANGE UP (ref 0–0.9)
MONOCYTES NFR BLD AUTO: 6.7 % — SIGNIFICANT CHANGE UP (ref 2–14)
NEUTROPHILS # BLD AUTO: 6.39 K/UL — SIGNIFICANT CHANGE UP (ref 1.8–7.4)
NEUTROPHILS NFR BLD AUTO: 70.9 % — SIGNIFICANT CHANGE UP (ref 43–77)
PLATELET # BLD AUTO: 164 K/UL — SIGNIFICANT CHANGE UP (ref 150–400)
RBC # BLD: 3.64 M/UL — LOW (ref 3.8–5.2)
RBC # FLD: 13.6 % — SIGNIFICANT CHANGE UP (ref 10.3–14.5)
T PALLIDUM AB TITR SER: NEGATIVE — SIGNIFICANT CHANGE UP
WBC # BLD: 9.02 K/UL — SIGNIFICANT CHANGE UP (ref 3.8–10.5)
WBC # FLD AUTO: 9.02 K/UL — SIGNIFICANT CHANGE UP (ref 3.8–10.5)

## 2019-08-26 RX ORDER — KETOROLAC TROMETHAMINE 30 MG/ML
10 SYRINGE (ML) INJECTION ONCE
Refills: 0 | Status: DISCONTINUED | OUTPATIENT
Start: 2019-08-26 | End: 2019-08-28

## 2019-08-26 RX ORDER — DOCUSATE SODIUM 100 MG
1 CAPSULE ORAL
Qty: 0 | Refills: 0 | DISCHARGE
Start: 2019-08-26

## 2019-08-26 RX ORDER — KETOROLAC TROMETHAMINE 30 MG/ML
30 SYRINGE (ML) INJECTION
Refills: 0 | Status: DISCONTINUED | OUTPATIENT
Start: 2019-08-26 | End: 2019-08-28

## 2019-08-26 RX ORDER — LANOLIN
1 OINTMENT (GRAM) TOPICAL
Qty: 0 | Refills: 0 | DISCHARGE
Start: 2019-08-26

## 2019-08-26 RX ORDER — IBUPROFEN 200 MG
3 TABLET ORAL
Qty: 0 | Refills: 0 | DISCHARGE
Start: 2019-08-26

## 2019-08-26 RX ORDER — MAGNESIUM HYDROXIDE 400 MG/1
30 TABLET, CHEWABLE ORAL
Qty: 0 | Refills: 0 | DISCHARGE
Start: 2019-08-26

## 2019-08-26 RX ORDER — ACETAMINOPHEN 500 MG
3 TABLET ORAL
Qty: 0 | Refills: 0 | DISCHARGE
Start: 2019-08-26

## 2019-08-26 RX ADMIN — Medication 30 MILLIGRAM(S): at 13:00

## 2019-08-26 RX ADMIN — Medication 975 MILLIGRAM(S): at 20:42

## 2019-08-26 RX ADMIN — Medication 30 MILLIGRAM(S): at 01:10

## 2019-08-26 RX ADMIN — SODIUM CHLORIDE 125 MILLILITER(S): 9 INJECTION, SOLUTION INTRAVENOUS at 05:22

## 2019-08-26 RX ADMIN — SIMETHICONE 80 MILLIGRAM(S): 80 TABLET, CHEWABLE ORAL at 00:48

## 2019-08-26 RX ADMIN — Medication 30 MILLIGRAM(S): at 00:48

## 2019-08-26 RX ADMIN — Medication 975 MILLIGRAM(S): at 20:12

## 2019-08-26 RX ADMIN — Medication 100 MILLIGRAM(S): at 06:21

## 2019-08-26 RX ADMIN — HEPARIN SODIUM 5000 UNIT(S): 5000 INJECTION INTRAVENOUS; SUBCUTANEOUS at 00:49

## 2019-08-26 RX ADMIN — Medication 30 MILLIGRAM(S): at 06:20

## 2019-08-26 RX ADMIN — Medication 30 MILLIGRAM(S): at 18:40

## 2019-08-26 RX ADMIN — Medication 30 MILLIGRAM(S): at 12:06

## 2019-08-26 RX ADMIN — HEPARIN SODIUM 5000 UNIT(S): 5000 INJECTION INTRAVENOUS; SUBCUTANEOUS at 12:06

## 2019-08-26 RX ADMIN — Medication 30 MILLIGRAM(S): at 19:10

## 2019-08-26 RX ADMIN — Medication 30 MILLIGRAM(S): at 06:50

## 2019-08-26 RX ADMIN — SIMETHICONE 80 MILLIGRAM(S): 80 TABLET, CHEWABLE ORAL at 05:11

## 2019-08-26 NOTE — DISCHARGE NOTE OB - MATERIALS PROVIDED
Breastfeeding Guide and Packet/Breastfeeding Mother’s Support Group Information/Guide to Postpartum Care/[facility] Hearing Screen Program/[facility] [place] Screening Program/Breastfeeding Log/Birth Certificate Instructions

## 2019-08-26 NOTE — PROGRESS NOTE ADULT - SUBJECTIVE AND OBJECTIVE BOX
OB Progress Note:  Delivery, POD#1    S: 28yo POD#1 s/p LTCS . Her pain is well controlled. She is tolerating a regular diet and passing flatus. Denies N/V. Denies CP/SOB/lightheadedness/dizziness.   She is ambulating without difficulty.   Voiding spontaneously.     O:   Vital Signs Last 24 Hrs  T(C): 36.7 (26 Aug 2019 05:21), Max: 37.2 (25 Aug 2019 14:00)  T(F): 98.1 (26 Aug 2019 05:21), Max: 98.9 (25 Aug 2019 14:00)  HR: 65 (26 Aug 2019 05:21) (65 - 86)  BP: 95/65 (26 Aug 2019 05:21) (95/65 - 121/72)  BP(mean): 89 (25 Aug 2019 07:10) (89 - 89)  RR: 18 (26 Aug 2019 05:21) (16 - 18)  SpO2: 98% (26 Aug 2019 00:55) (97% - 99%)    Labs:  Blood type: A Negative  Rubella IgG: RPR:                           13.8   11.0<H> >-----------< 222    (  @ 03:15 )             40.5      PE:  General: NAD  Abdomen: Mildly distended, appropriately tender, incision c/d/i with dermabond present.  Extremities: No erythema, no pitting edema

## 2019-08-26 NOTE — PROGRESS NOTE ADULT - SUBJECTIVE AND OBJECTIVE BOX
Section/Postpartum    MARY RUTH is a  29y woman , who is now post-operative day: 1    PAST MEDICAL & SURGICAL HISTORY:  No pertinent past medical history  H/O  section      Subjective:  The patient feels well.  She is ambulating.   She is tolerating regular diet.  She denies nausea and vomiting.  She is voiding.  Her pain is controlled.  She reports normal postpartum bleeding.  She is breastfeeding.    Physical exam:    Vital Signs Last 24 Hrs  T(C): 36.7 (26 Aug 2019 05:21), Max: 37.2 (25 Aug 2019 14:00)  T(F): 98.1 (26 Aug 2019 05:21), Max: 98.9 (25 Aug 2019 14:00)  HR: 65 (26 Aug 2019 05:21) (65 - 82)  BP: 95/65 (26 Aug 2019 05:21) (95/65 - 117/73)  BP(mean): --  RR: 18 (26 Aug 2019 05:21) (18 - 18)  SpO2: 98% (26 Aug 2019 00:55) (97% - 99%)    General: alert and oriented in no acute distress.  Breast: Soft, nontender, not engorged.  Abdomen: Soft, nontender, not distended ,  Uterine fundus is firm and at below the umbilicus, BS (+), Flatus (+), Bowel Movement (-)  Incision: Clean, dry, and intact, bandage has been removed  Pelvic: Normal lochia noted  Ext: No calf tenderness  Lochia: not excessive      LABS:                        13.8   11.0  )-----------( 222      ( 25 Aug 2019 03:15 )             40.5       Rubella status: Immune     Blood Type: ABO Interpretation: A (19 @ 02:51)                   Allergies    No Known Allergies    Intolerances        MEDICATIONS  (STANDING):  acetaminophen   Tablet .. 975 milliGRAM(s) Oral <User Schedule>  diphtheria/tetanus/pertussis (acellular) Vaccine (ADAcel) 0.5 milliLiter(s) IntraMuscular once  fentaNYL (3 MICROgram(s)/mL) + BUpivacaine 0.01% in 0.9% Sodium Chloride PCEA 250 milliLiter(s) Epidural PCA Continuous  heparin  Injectable 5000 Unit(s) SubCutaneous every 12 hours  ibuprofen  Tablet. 600 milliGRAM(s) Oral every 6 hours  ketorolac   Injectable 30 milliGRAM(s) IV Push every 6 hours  lactated ringers. 1000 milliLiter(s) (125 mL/Hr) IV Continuous <Continuous>  oxytocin Infusion 333.333 milliUNIT(s)/Min (1000 mL/Hr) IV Continuous <Continuous>    MEDICATIONS  (PRN):  diphenhydrAMINE 25 milliGRAM(s) Oral every 6 hours PRN Itching  docusate sodium 100 milliGRAM(s) Oral two times a day PRN Stool softening  fentaNYL (3 MICROgram(s)/mL) + BUpivacaine 0.01% in 0.9% Sodium Chloride PCEA Rescue Clinician Bolus 5 milliLiter(s) Epidural every 15 minutes PRN Moderate Pain (4 - 6)  glycerin Suppository - Adult 1 Suppository(s) Rectal at bedtime PRN Constipation  lanolin Ointment 1 Application(s) Topical every 6 hours PRN Sore Nipples  magnesium hydroxide Suspension 30 milliLiter(s) Oral two times a day PRN Constipation  naloxone Injectable 0.1 milliGRAM(s) IV Push every 3 minutes PRN For ANY of the following changes in patient status:  A. RR LESS THAN 10 breaths per minute, B. Oxygen saturation LESS THAN 90%, C. Sedation score of 6  ondansetron Injectable 4 milliGRAM(s) IV Push every 6 hours PRN Nausea  oxyCODONE    IR 5 milliGRAM(s) Oral every 3 hours PRN Moderate to Severe Pain (4-10)  oxyCODONE    IR 5 milliGRAM(s) Oral once PRN Moderate to Severe Pain (4-10)  simethicone 80 milliGRAM(s) Chew every 4 hours PRN Gas        Assessment and Plan:    POD # 1 s/p  RCS with bilateral salpingectomy       Doing well.  Encourage ambulation, may shower, routine postop care.

## 2019-08-26 NOTE — DISCHARGE NOTE OB - NS OB DC MMR REASON NOT RECEIVED
Patient notified of EGD/ colonoscopy results, verbalized understanding. Stated he has gained weight and is feeling better. Will call if he is having any further issues. Amalia Chung LPN  
Contraindicated

## 2019-08-26 NOTE — DISCHARGE NOTE OB - MEDICATION SUMMARY - MEDICATIONS TO TAKE
I will START or STAY ON the medications listed below when I get home from the hospital:    acetaminophen 325 mg oral tablet  -- 3 tab(s) by mouth   -- Indication: For Pain    ibuprofen 200 mg oral tablet  -- 3 tab(s) by mouth every 6 hours  -- Indication: For Pain    magnesium hydroxide 8% oral suspension  -- 30 milliliter(s) by mouth 2 times a day, As needed, Constipation  -- Indication: For Constipation    lanolin topical ointment  -- 1 application on skin every 6 hours, As needed, Sore Nipples  -- Indication: For Sore nipples    Prenatal Multivitamins with Folic Acid 1 mg oral tablet  -- 1 tab(s) by mouth once a day  -- Indication: For vitamin    docusate sodium 100 mg oral capsule  -- 1 cap(s) by mouth 2 times a day, As needed, Stool softening  -- Indication: For Stool softner

## 2019-08-26 NOTE — DISCHARGE NOTE OB - PATIENT PORTAL LINK FT
You can access the [facility] Patient Portal, offered by [facility], by registering with the following website: [email or web address]

## 2019-08-26 NOTE — DISCHARGE NOTE OB - CARE PLAN
Principal Discharge DX:	39 weeks gestation of pregnancy  Goal:	Safe delivery of patient  Assessment and plan of treatment:	Nothing in the vagina, no tampons, douches, baths, swimming or sex for 6-8 weeks.  Regular diet, follow up visit in 3 weeks  Secondary Diagnosis:	Previous  section  Secondary Diagnosis:	Multiparity  Secondary Diagnosis:	Amniotic fluid leaking

## 2019-08-26 NOTE — DISCHARGE NOTE OB - CARE PROVIDERS DIRECT ADDRESSES
Detail Level: Simple Otc Regimen: Benzoyl peroxide wash for chest and back Plan: Recommended restarting antibiotics patient declined and would like to start stronger tretinoin ,DirectAddress_Unknown

## 2019-08-26 NOTE — DISCHARGE NOTE OB - HOSPITAL COURSE
30 y/o W/F  admitted at 39+ weeks with SROM. Pt had previous c/s x2 and was in active labor. She had repeat LFTCS and elective bilateral salpingectomy. She was delivered of a viable baby girl; apgars 9/9 wt 7'8".
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
## 2019-08-26 NOTE — DISCHARGE NOTE OB - CARE PROVIDER_API CALL
Tiffanie Hernadez)  Obstetrics and Gynecology  [address] [place] [address]  Phone: [phone]  Fax: [phone]  Follow Up Time:

## 2019-08-27 RX ORDER — IBUPROFEN 200 MG
600 TABLET ORAL EVERY 6 HOURS
Refills: 0 | Status: DISCONTINUED | OUTPATIENT
Start: 2019-08-27 | End: 2019-08-28

## 2019-08-27 RX ORDER — OXYCODONE HYDROCHLORIDE 5 MG/1
5 TABLET ORAL
Refills: 0 | Status: DISCONTINUED | OUTPATIENT
Start: 2019-08-27 | End: 2019-08-28

## 2019-08-27 RX ADMIN — Medication 975 MILLIGRAM(S): at 15:45

## 2019-08-27 RX ADMIN — Medication 600 MILLIGRAM(S): at 12:38

## 2019-08-27 RX ADMIN — Medication 30 MILLIGRAM(S): at 06:25

## 2019-08-27 RX ADMIN — Medication 975 MILLIGRAM(S): at 10:00

## 2019-08-27 RX ADMIN — Medication 975 MILLIGRAM(S): at 09:31

## 2019-08-27 RX ADMIN — Medication 975 MILLIGRAM(S): at 21:10

## 2019-08-27 RX ADMIN — Medication 600 MILLIGRAM(S): at 18:39

## 2019-08-27 RX ADMIN — HEPARIN SODIUM 5000 UNIT(S): 5000 INJECTION INTRAVENOUS; SUBCUTANEOUS at 00:42

## 2019-08-27 RX ADMIN — Medication 975 MILLIGRAM(S): at 20:40

## 2019-08-27 RX ADMIN — Medication 975 MILLIGRAM(S): at 15:15

## 2019-08-27 RX ADMIN — Medication 30 MILLIGRAM(S): at 00:42

## 2019-08-27 RX ADMIN — Medication 600 MILLIGRAM(S): at 18:09

## 2019-08-27 RX ADMIN — Medication 30 MILLIGRAM(S): at 07:00

## 2019-08-27 RX ADMIN — Medication 975 MILLIGRAM(S): at 03:07

## 2019-08-27 RX ADMIN — Medication 975 MILLIGRAM(S): at 03:40

## 2019-08-27 RX ADMIN — Medication 100 MILLIGRAM(S): at 06:34

## 2019-08-27 RX ADMIN — SODIUM CHLORIDE 125 MILLILITER(S): 9 INJECTION, SOLUTION INTRAVENOUS at 00:42

## 2019-08-27 RX ADMIN — Medication 30 MILLIGRAM(S): at 01:15

## 2019-08-27 RX ADMIN — Medication 600 MILLIGRAM(S): at 13:10

## 2019-08-27 RX ADMIN — HEPARIN SODIUM 5000 UNIT(S): 5000 INJECTION INTRAVENOUS; SUBCUTANEOUS at 12:38

## 2019-08-27 NOTE — PROGRESS NOTE ADULT - PROBLEM SELECTOR PLAN 1
- Continue regular diet.  - Increase ambulation.  - D/c PCEA today and transition to PO pain medication with motrin, tylenol and oxycodone PRN.     Kyaw Virgen PGY1

## 2019-08-27 NOTE — PROGRESS NOTE ADULT - SUBJECTIVE AND OBJECTIVE BOX
Section/Postpartum    MARY RUTH is a  29y woman  , who is now post-operative day: 2    PAST MEDICAL & SURGICAL HISTORY:  No pertinent past medical history  H/O  section      Subjective:  The patient feels well.  She is ambulating.   She is tolerating regular diet.  She denies nausea and vomiting.  She is voiding.  Her pain is controlled.  She reports normal postpartum bleeding.  She is breastfeeding.    Physical exam:    Vital Signs Last 24 Hrs  T(C): 36.7 (27 Aug 2019 05:00), Max: 36.9 (26 Aug 2019 21:30)  T(F): 98.1 (27 Aug 2019 05:00), Max: 98.5 (26 Aug 2019 21:30)  HR: 63 (27 Aug 2019 05:00) (63 - 78)  BP: 103/64 (27 Aug 2019 05:00) (93/60 - 110/71)  BP(mean): --  RR: 18 (27 Aug 2019 05:00) (18 - 18)  SpO2: 99% (27 Aug 2019 05:00) (97% - 99%)    General: alert and oriented in no acute distress.  Breast: Soft, nontender, not engorged.  Abdomen: Soft, nontender, not distended ,  Uterine fundus is firm and at below the umbilicus, BS (+), Flatus (+), Bowel Movement (-)  Incision: Clean, dry, and intact, bandage has been removed  Pelvic: Normal lochia noted  Ext: No calf tenderness  Lochia: not excessive    LABS:                        11.2   9.02  )-----------( 164      ( 26 Aug 2019 08:31 )             34.8       Rubella status:  Immune    Blood Type: Type + Screen (19 @ 02:51)    ABO Interpretation: A    Rh Interpretation: Negative    Antibody Screen: Positive                       Allergies    No Known Allergies    Intolerances        MEDICATIONS  (STANDING):  acetaminophen   Tablet .. 975 milliGRAM(s) Oral <User Schedule>  diphtheria/tetanus/pertussis (acellular) Vaccine (ADAcel) 0.5 milliLiter(s) IntraMuscular once  fentaNYL (3 MICROgram(s)/mL) + BUpivacaine 0.01% in 0.9% Sodium Chloride PCEA 250 milliLiter(s) Epidural PCA Continuous  heparin  Injectable 5000 Unit(s) SubCutaneous every 12 hours  ibuprofen  Tablet. 600 milliGRAM(s) Oral every 6 hours  ketorolac   Injectable 30 milliGRAM(s) IV Push four times a day  lactated ringers. 1000 milliLiter(s) (125 mL/Hr) IV Continuous <Continuous>  oxytocin Infusion 333.333 milliUNIT(s)/Min (1000 mL/Hr) IV Continuous <Continuous>    MEDICATIONS  (PRN):  diphenhydrAMINE 25 milliGRAM(s) Oral every 6 hours PRN Itching  docusate sodium 100 milliGRAM(s) Oral two times a day PRN Stool softening  fentaNYL (3 MICROgram(s)/mL) + BUpivacaine 0.01% in 0.9% Sodium Chloride PCEA Rescue Clinician Bolus 5 milliLiter(s) Epidural every 15 minutes PRN Moderate Pain (4 - 6)  glycerin Suppository - Adult 1 Suppository(s) Rectal at bedtime PRN Constipation  ketorolac 10 milliGRAM(s) Oral once PRN Moderate Pain (4 - 6)  lanolin Ointment 1 Application(s) Topical every 6 hours PRN Sore Nipples  magnesium hydroxide Suspension 30 milliLiter(s) Oral two times a day PRN Constipation  naloxone Injectable 0.1 milliGRAM(s) IV Push every 3 minutes PRN For ANY of the following changes in patient status:  A. RR LESS THAN 10 breaths per minute, B. Oxygen saturation LESS THAN 90%, C. Sedation score of 6  ondansetron Injectable 4 milliGRAM(s) IV Push every 6 hours PRN Nausea  oxyCODONE    IR 5 milliGRAM(s) Oral every 3 hours PRN Moderate to Severe Pain (4-10)  oxyCODONE    IR 5 milliGRAM(s) Oral once PRN Moderate to Severe Pain (4-10)  simethicone 80 milliGRAM(s) Chew every 4 hours PRN Gas        Assessment and Plan:    POD # 2 s/p  RCS with bilateral salpingectomy       Doing well.  Encourage ambulation, may shower, routine postop care.

## 2019-08-27 NOTE — PROGRESS NOTE ADULT - SUBJECTIVE AND OBJECTIVE BOX
OB Progress Note: LTCS, POD#2    S: 28yo POD#2 s/p LTCS. Pain is well controlled. She is tolerating a regular diet and passing flatus. She is voiding spontaneously, and ambulating without difficulty. Denies CP/SOB. Denies lightheadedness/dizziness. Denies N/V.    O:  Vitals:  Vital Signs Last 24 Hrs  T(C): 36.8 (27 Aug 2019 01:10), Max: 36.9 (26 Aug 2019 21:30)  T(F): 98.3 (27 Aug 2019 01:10), Max: 98.5 (26 Aug 2019 21:30)  HR: 69 (27 Aug 2019 01:10) (69 - 78)  BP: 102/65 (27 Aug 2019 01:10) (93/60 - 110/71)  BP(mean): --  RR: 18 (27 Aug 2019 01:10) (18 - 18)  SpO2: 97% (26 Aug 2019 21:30) (97% - 99%)    MEDICATIONS  (STANDING):  acetaminophen   Tablet .. 975 milliGRAM(s) Oral <User Schedule>  diphtheria/tetanus/pertussis (acellular) Vaccine (ADAcel) 0.5 milliLiter(s) IntraMuscular once  fentaNYL (3 MICROgram(s)/mL) + BUpivacaine 0.01% in 0.9% Sodium Chloride PCEA 250 milliLiter(s) Epidural PCA Continuous  heparin  Injectable 5000 Unit(s) SubCutaneous every 12 hours  ibuprofen  Tablet. 600 milliGRAM(s) Oral every 6 hours  ketorolac   Injectable 30 milliGRAM(s) IV Push four times a day  lactated ringers. 1000 milliLiter(s) (125 mL/Hr) IV Continuous <Continuous>  oxytocin Infusion 333.333 milliUNIT(s)/Min (1000 mL/Hr) IV Continuous <Continuous>      MEDICATIONS  (PRN):  diphenhydrAMINE 25 milliGRAM(s) Oral every 6 hours PRN Itching  docusate sodium 100 milliGRAM(s) Oral two times a day PRN Stool softening  fentaNYL (3 MICROgram(s)/mL) + BUpivacaine 0.01% in 0.9% Sodium Chloride PCEA Rescue Clinician Bolus 5 milliLiter(s) Epidural every 15 minutes PRN Moderate Pain (4 - 6)  glycerin Suppository - Adult 1 Suppository(s) Rectal at bedtime PRN Constipation  ketorolac 10 milliGRAM(s) Oral once PRN Moderate Pain (4 - 6)  lanolin Ointment 1 Application(s) Topical every 6 hours PRN Sore Nipples  magnesium hydroxide Suspension 30 milliLiter(s) Oral two times a day PRN Constipation  naloxone Injectable 0.1 milliGRAM(s) IV Push every 3 minutes PRN For ANY of the following changes in patient status:  A. RR LESS THAN 10 breaths per minute, B. Oxygen saturation LESS THAN 90%, C. Sedation score of 6  ondansetron Injectable 4 milliGRAM(s) IV Push every 6 hours PRN Nausea  oxyCODONE    IR 5 milliGRAM(s) Oral every 3 hours PRN Moderate to Severe Pain (4-10)  oxyCODONE    IR 5 milliGRAM(s) Oral once PRN Moderate to Severe Pain (4-10)  simethicone 80 milliGRAM(s) Chew every 4 hours PRN Gas      Labs:  Blood type: A Negative  Rubella IgG: RPR: Negative                          11.2<L>   9.02 >-----------< 164    ( 08-26 @ 08:31 )             34.8                        13.8   11.0<H> >-----------< 222    ( 08-25 @ 03:15 )             40.5        PE:  General: NAD  Abdomen: Soft, appropriately tender, incision c/d/i.  Extremities: No erythema, no pitting edema

## 2019-08-28 VITALS
RESPIRATION RATE: 18 BRPM | TEMPERATURE: 98 F | SYSTOLIC BLOOD PRESSURE: 117 MMHG | HEART RATE: 62 BPM | DIASTOLIC BLOOD PRESSURE: 62 MMHG

## 2019-08-28 LAB
HCT VFR BLD CALC: 34.3 % — LOW (ref 34.5–45)
HGB BLD-MCNC: 11.4 G/DL — LOW (ref 11.5–15.5)
MCHC RBC-ENTMCNC: 32 PG — SIGNIFICANT CHANGE UP (ref 27–34)
MCHC RBC-ENTMCNC: 33.3 GM/DL — SIGNIFICANT CHANGE UP (ref 32–36)
MCV RBC AUTO: 95.8 FL — SIGNIFICANT CHANGE UP (ref 80–100)
PLATELET # BLD AUTO: 194 K/UL — SIGNIFICANT CHANGE UP (ref 150–400)
RBC # BLD: 3.58 M/UL — LOW (ref 3.8–5.2)
RBC # FLD: 12.3 % — SIGNIFICANT CHANGE UP (ref 10.3–14.5)
WBC # BLD: 7.2 K/UL — SIGNIFICANT CHANGE UP (ref 3.8–10.5)
WBC # FLD AUTO: 7.2 K/UL — SIGNIFICANT CHANGE UP (ref 3.8–10.5)

## 2019-08-28 PROCEDURE — 88302 TISSUE EXAM BY PATHOLOGIST: CPT

## 2019-08-28 PROCEDURE — C1765: CPT

## 2019-08-28 PROCEDURE — 59025 FETAL NON-STRESS TEST: CPT

## 2019-08-28 PROCEDURE — 85027 COMPLETE CBC AUTOMATED: CPT

## 2019-08-28 PROCEDURE — 85460 HEMOGLOBIN FETAL: CPT

## 2019-08-28 PROCEDURE — G0463: CPT

## 2019-08-28 PROCEDURE — 59050 FETAL MONITOR W/REPORT: CPT

## 2019-08-28 PROCEDURE — 86780 TREPONEMA PALLIDUM: CPT

## 2019-08-28 RX ADMIN — Medication 600 MILLIGRAM(S): at 12:00

## 2019-08-28 RX ADMIN — Medication 600 MILLIGRAM(S): at 05:24

## 2019-08-28 RX ADMIN — Medication 600 MILLIGRAM(S): at 00:59

## 2019-08-28 RX ADMIN — Medication 600 MILLIGRAM(S): at 11:14

## 2019-08-28 RX ADMIN — Medication 975 MILLIGRAM(S): at 03:43

## 2019-08-28 RX ADMIN — Medication 600 MILLIGRAM(S): at 05:54

## 2019-08-28 RX ADMIN — HEPARIN SODIUM 5000 UNIT(S): 5000 INJECTION INTRAVENOUS; SUBCUTANEOUS at 00:29

## 2019-08-28 RX ADMIN — Medication 600 MILLIGRAM(S): at 00:29

## 2019-08-28 RX ADMIN — Medication 975 MILLIGRAM(S): at 04:13

## 2019-08-28 NOTE — PROGRESS NOTE ADULT - PROBLEM SELECTOR PLAN 1
- Continue motrin, tylenol, oxycodone PRN for pain control.  - Increase ambulation  - Continue regular diet  - Discharge planning    Kyaw Virgen PGY1

## 2019-08-28 NOTE — PROGRESS NOTE ADULT - SUBJECTIVE AND OBJECTIVE BOX
OB Postpartum Note:  Delivery, POD#3    S: 30yo POD#3 s/p LTCS. The patient feels well.  Pain is well controlled. She is tolerating a regular diet and passing flatus. She is voiding spontaneously, and ambulating without difficulty. Denies CP/SOB. Denies lightheadedness/dizziness. Denies N/V.    O:  Vitals:  Vital Signs Last 24 Hrs  T(C): 37.1 (27 Aug 2019 21:00), Max: 37.1 (27 Aug 2019 21:00)  T(F): 98.8 (27 Aug 2019 21:00), Max: 98.8 (27 Aug 2019 21:00)  HR: 67 (27 Aug 2019 21:00) (67 - 69)  BP: 109/69 (27 Aug 2019 21:00) (107/71 - 109/69)  BP(mean): --  RR: 18 (27 Aug 2019 21:00) (18 - 18)  SpO2: --    MEDICATIONS  (STANDING):  acetaminophen   Tablet .. 975 milliGRAM(s) Oral <User Schedule>  diphtheria/tetanus/pertussis (acellular) Vaccine (ADAcel) 0.5 milliLiter(s) IntraMuscular once  heparin  Injectable 5000 Unit(s) SubCutaneous every 12 hours  ibuprofen  Tablet. 600 milliGRAM(s) Oral every 6 hours  ketorolac   Injectable 30 milliGRAM(s) IV Push four times a day  lactated ringers. 1000 milliLiter(s) (125 mL/Hr) IV Continuous <Continuous>  oxytocin Infusion 333.333 milliUNIT(s)/Min (1000 mL/Hr) IV Continuous <Continuous>    MEDICATIONS  (PRN):  diphenhydrAMINE 25 milliGRAM(s) Oral every 6 hours PRN Itching  docusate sodium 100 milliGRAM(s) Oral two times a day PRN Stool softening  glycerin Suppository - Adult 1 Suppository(s) Rectal at bedtime PRN Constipation  ketorolac 10 milliGRAM(s) Oral once PRN Moderate Pain (4 - 6)  lanolin Ointment 1 Application(s) Topical every 6 hours PRN Sore Nipples  magnesium hydroxide Suspension 30 milliLiter(s) Oral two times a day PRN Constipation  oxyCODONE    IR 5 milliGRAM(s) Oral once PRN Moderate to Severe Pain (4-10)  oxyCODONE    IR 5 milliGRAM(s) Oral every 3 hours PRN Moderate to Severe Pain (4-10)  simethicone 80 milliGRAM(s) Chew every 4 hours PRN Gas      LABS:  Blood type: A Negative  Rubella IgG: RPR: Negative                          11.2<L>   9.02 >-----------< 164    ( 08-26 @ 08:31 )             34.8      Physical exam:  Gen: NAD  Abdomen: Soft, nontender, no distension , firm uterine fundus at umbilicus.  Incision: Clean, dry, and intact   Pelvic: Normal lochia noted  Ext: No calf tenderness

## 2019-08-28 NOTE — PROGRESS NOTE ADULT - SUBJECTIVE AND OBJECTIVE BOX
Section/Postpartum    MARY RUTH is a  29y woman    , who is now post-operative day 3.    PAST MEDICAL & SURGICAL HISTORY:  No pertinent past medical history  H/O  section      Subjective:  The patient feels well.  She is ambulating without difficulty.  She is tolerating PO.  She is voiding.  She denies nausea and vomiting.  Her pain is controlled.  She reports normal postpartum bleeding  She is breastfeeding.  She is formula feeding.    Physical exam:    Vital Signs Last 24 Hrs  T(C): 36.6 (28 Aug 2019 05:15), Max: 37.1 (27 Aug 2019 21:00)  T(F): 97.9 (28 Aug 2019 05:15), Max: 98.8 (27 Aug 2019 21:00)  HR: 62 (28 Aug 2019 05:15) (62 - 69)  BP: 117/62 (28 Aug 2019 05:15) (107/71 - 117/62)  BP(mean): --  RR: 18 (28 Aug 2019 05:15) (18 - 18)  SpO2: --    General Apperance: alert and oriented in no acute distress  Breast: Soft, nontender, non engorged  Abdomen: Soft, nontender, not distended,  Uterine fundus is firm and below the umbilicus, BS(+), Flatus(+), Bowel Movement (+)  Incision: Clean, dry, and intact   Pelvic: Normal lochia noted  Ext: No calf tenderness, No edema or cyanosis  Lochia: not excessive    LABS:                        11.4   7.2   )-----------( 194      ( 28 Aug 2019 06:53 )             34.3     Blood Type: Type + Screen (19 @ 02:51)    ABO Interpretation: A    Rh Interpretation: Negative    Antibody Screen: Positive      Rubella status:  Immune    Allergies    No Known Allergies    Intolerances        MEDICATIONS  (STANDING):  acetaminophen   Tablet .. 975 milliGRAM(s) Oral <User Schedule>  diphtheria/tetanus/pertussis (acellular) Vaccine (ADAcel) 0.5 milliLiter(s) IntraMuscular once  heparin  Injectable 5000 Unit(s) SubCutaneous every 12 hours  ibuprofen  Tablet. 600 milliGRAM(s) Oral every 6 hours  ketorolac   Injectable 30 milliGRAM(s) IV Push four times a day  lactated ringers. 1000 milliLiter(s) (125 mL/Hr) IV Continuous <Continuous>  oxytocin Infusion 333.333 milliUNIT(s)/Min (1000 mL/Hr) IV Continuous <Continuous>    MEDICATIONS  (PRN):  diphenhydrAMINE 25 milliGRAM(s) Oral every 6 hours PRN Itching  docusate sodium 100 milliGRAM(s) Oral two times a day PRN Stool softening  glycerin Suppository - Adult 1 Suppository(s) Rectal at bedtime PRN Constipation  ketorolac 10 milliGRAM(s) Oral once PRN Moderate Pain (4 - 6)  lanolin Ointment 1 Application(s) Topical every 6 hours PRN Sore Nipples  magnesium hydroxide Suspension 30 milliLiter(s) Oral two times a day PRN Constipation  oxyCODONE    IR 5 milliGRAM(s) Oral once PRN Moderate to Severe Pain (4-10)  oxyCODONE    IR 5 milliGRAM(s) Oral every 3 hours PRN Moderate to Severe Pain (4-10)  simethicone 80 milliGRAM(s) Chew every 4 hours PRN Gas        Assessment and Plan:    POD # 3  S/P RCS w/BS         Doing well.  Encouraged to ambulate.  Drink plenty of water and fluids.  Discharge home today.  Paient to take ibuprofen 600mg every 6 hours and/or Tylenol 2 (325mg tablet) tablets every 6 hoursfor pain  Follow up in 1 week for incision check, MARY RUTH is to call the office for an appointment.  To use abdominal binder while awake as needed  Verbal discharge instructions d/w patient  - discharge summary to be given to her on discharge for the hospital.  Call for fevers, chills, nausea, vomiting, heavy vaginal bleeding, vaginal discharge, severe pain, symptoms of depression, problems with incision or any other concerning symptoms.  Nothing in vagina and no heavy lifting for  6-8 weeks. No sex!  No driving x 2 weeks.  Patient to continue with prenatal vitamins.

## 2019-08-30 ENCOUNTER — TRANSCRIPTION ENCOUNTER (OUTPATIENT)
Age: 29
End: 2019-08-30

## 2019-09-02 ENCOUNTER — EMERGENCY (EMERGENCY)
Facility: HOSPITAL | Age: 29
LOS: 1 days | Discharge: HOME | End: 2019-09-02
Attending: STUDENT IN AN ORGANIZED HEALTH CARE EDUCATION/TRAINING PROGRAM
Payer: COMMERCIAL

## 2019-09-02 VITALS
RESPIRATION RATE: 16 BRPM | SYSTOLIC BLOOD PRESSURE: 123 MMHG | OXYGEN SATURATION: 100 % | DIASTOLIC BLOOD PRESSURE: 81 MMHG | HEART RATE: 76 BPM | TEMPERATURE: 98 F

## 2019-09-02 VITALS
RESPIRATION RATE: 18 BRPM | HEART RATE: 82 BPM | SYSTOLIC BLOOD PRESSURE: 133 MMHG | TEMPERATURE: 98 F | OXYGEN SATURATION: 98 % | WEIGHT: 164.91 LBS | HEIGHT: 62 IN | DIASTOLIC BLOOD PRESSURE: 89 MMHG

## 2019-09-02 DIAGNOSIS — Z98.89 OTHER SPECIFIED POSTPROCEDURAL STATES: Chronic | ICD-10-CM

## 2019-09-02 DIAGNOSIS — L76.82 OTHER POSTPROCEDURAL COMPLICATIONS OF SKIN AND SUBCUTANEOUS TISSUE: ICD-10-CM

## 2019-09-02 LAB
ALBUMIN SERPL ELPH-MCNC: 4.1 G/DL — SIGNIFICANT CHANGE UP (ref 3.3–5)
ALP SERPL-CCNC: 95 U/L — SIGNIFICANT CHANGE UP (ref 40–120)
ALT FLD-CCNC: 29 U/L — SIGNIFICANT CHANGE UP (ref 10–45)
ANION GAP SERPL CALC-SCNC: 16 MMOL/L — SIGNIFICANT CHANGE UP (ref 5–17)
APPEARANCE UR: CLEAR — SIGNIFICANT CHANGE UP
AST SERPL-CCNC: 16 U/L — SIGNIFICANT CHANGE UP (ref 10–40)
BACTERIA # UR AUTO: NEGATIVE — SIGNIFICANT CHANGE UP
BASOPHILS # BLD AUTO: 0.1 K/UL — SIGNIFICANT CHANGE UP (ref 0–0.2)
BASOPHILS NFR BLD AUTO: 0.5 % — SIGNIFICANT CHANGE UP (ref 0–2)
BILIRUB SERPL-MCNC: 0.5 MG/DL — SIGNIFICANT CHANGE UP (ref 0.2–1.2)
BILIRUB UR-MCNC: NEGATIVE — SIGNIFICANT CHANGE UP
BUN SERPL-MCNC: 13 MG/DL — SIGNIFICANT CHANGE UP (ref 7–23)
CALCIUM SERPL-MCNC: 9.6 MG/DL — SIGNIFICANT CHANGE UP (ref 8.4–10.5)
CHLORIDE SERPL-SCNC: 104 MMOL/L — SIGNIFICANT CHANGE UP (ref 96–108)
CO2 SERPL-SCNC: 19 MMOL/L — LOW (ref 22–31)
COLOR SPEC: COLORLESS — SIGNIFICANT CHANGE UP
CREAT SERPL-MCNC: 0.59 MG/DL — SIGNIFICANT CHANGE UP (ref 0.5–1.3)
DIFF PNL FLD: ABNORMAL
EOSINOPHIL # BLD AUTO: 0.2 K/UL — SIGNIFICANT CHANGE UP (ref 0–0.5)
EOSINOPHIL NFR BLD AUTO: 2 % — SIGNIFICANT CHANGE UP (ref 0–6)
EPI CELLS # UR: 4 — SIGNIFICANT CHANGE UP
GLUCOSE SERPL-MCNC: 85 MG/DL — SIGNIFICANT CHANGE UP (ref 70–99)
GLUCOSE UR QL: NEGATIVE — SIGNIFICANT CHANGE UP
HCT VFR BLD CALC: 40.7 % — SIGNIFICANT CHANGE UP (ref 34.5–45)
HGB BLD-MCNC: 13.2 G/DL — SIGNIFICANT CHANGE UP (ref 11.5–15.5)
HYALINE CASTS # UR AUTO: 1 /LPF — SIGNIFICANT CHANGE UP (ref 0–2)
KETONES UR-MCNC: SIGNIFICANT CHANGE UP
LEUKOCYTE ESTERASE UR-ACNC: NEGATIVE — SIGNIFICANT CHANGE UP
LYMPHOCYTES # BLD AUTO: 1.4 K/UL — SIGNIFICANT CHANGE UP (ref 1–3.3)
LYMPHOCYTES # BLD AUTO: 12.7 % — LOW (ref 13–44)
MCHC RBC-ENTMCNC: 30.6 PG — SIGNIFICANT CHANGE UP (ref 27–34)
MCHC RBC-ENTMCNC: 32.5 GM/DL — SIGNIFICANT CHANGE UP (ref 32–36)
MCV RBC AUTO: 94.3 FL — SIGNIFICANT CHANGE UP (ref 80–100)
MONOCYTES # BLD AUTO: 0.4 K/UL — SIGNIFICANT CHANGE UP (ref 0–0.9)
MONOCYTES NFR BLD AUTO: 4 % — SIGNIFICANT CHANGE UP (ref 2–14)
NEUTROPHILS # BLD AUTO: 8.8 K/UL — HIGH (ref 1.8–7.4)
NEUTROPHILS NFR BLD AUTO: 80.8 % — HIGH (ref 43–77)
NITRITE UR-MCNC: NEGATIVE — SIGNIFICANT CHANGE UP
PH UR: 6 — SIGNIFICANT CHANGE UP (ref 5–8)
PLATELET # BLD AUTO: 366 K/UL — SIGNIFICANT CHANGE UP (ref 150–400)
POTASSIUM SERPL-MCNC: 3.9 MMOL/L — SIGNIFICANT CHANGE UP (ref 3.5–5.3)
POTASSIUM SERPL-SCNC: 3.9 MMOL/L — SIGNIFICANT CHANGE UP (ref 3.5–5.3)
PROT SERPL-MCNC: 7.5 G/DL — SIGNIFICANT CHANGE UP (ref 6–8.3)
PROT UR-MCNC: NEGATIVE — SIGNIFICANT CHANGE UP
RBC # BLD: 4.31 M/UL — SIGNIFICANT CHANGE UP (ref 3.8–5.2)
RBC # FLD: 11.9 % — SIGNIFICANT CHANGE UP (ref 10.3–14.5)
RBC CASTS # UR COMP ASSIST: 0 /HPF — SIGNIFICANT CHANGE UP (ref 0–4)
SODIUM SERPL-SCNC: 139 MMOL/L — SIGNIFICANT CHANGE UP (ref 135–145)
SP GR SPEC: 1 — LOW (ref 1.01–1.02)
UROBILINOGEN FLD QL: NEGATIVE — SIGNIFICANT CHANGE UP
WBC # BLD: 10.9 K/UL — HIGH (ref 3.8–10.5)
WBC # FLD AUTO: 10.9 K/UL — HIGH (ref 3.8–10.5)
WBC UR QL: 1 /HPF — SIGNIFICANT CHANGE UP (ref 0–5)

## 2019-09-02 PROCEDURE — 80053 COMPREHEN METABOLIC PANEL: CPT

## 2019-09-02 PROCEDURE — 87086 URINE CULTURE/COLONY COUNT: CPT

## 2019-09-02 PROCEDURE — 81001 URINALYSIS AUTO W/SCOPE: CPT

## 2019-09-02 PROCEDURE — 99284 EMERGENCY DEPT VISIT MOD MDM: CPT

## 2019-09-02 PROCEDURE — 85027 COMPLETE CBC AUTOMATED: CPT

## 2019-09-02 PROCEDURE — 87186 SC STD MICRODIL/AGAR DIL: CPT

## 2019-09-02 PROCEDURE — 99283 EMERGENCY DEPT VISIT LOW MDM: CPT

## 2019-09-02 RX ORDER — ACETAMINOPHEN 500 MG
975 TABLET ORAL ONCE
Refills: 0 | Status: COMPLETED | OUTPATIENT
Start: 2019-09-02 | End: 2019-09-02

## 2019-09-02 RX ADMIN — Medication 975 MILLIGRAM(S): at 18:03

## 2019-09-02 NOTE — ED PROVIDER NOTE - PATIENT PORTAL LINK FT
You can access the FollowMyHealth Patient Portal offered by [facility] by registering at the following website: [email or web address]. By joining [facility]’s FollowMyHealth portal, you will also be able to view your health information using other applications (apps) compatible with our system.

## 2019-09-02 NOTE — CONSULT NOTE ADULT - ASSESSMENT
Patient is a 29y   POD#8 s/p rLTCS who presents with new onset L sided pain and serous incisional drainage. Per patient while lying in bed on Friday she coughed and immediately felt a L sided tearing pain. Given that pain is improved with medication, patient is hemodynamically stable, and incision appears intact complaints likely benign 2/2 postoperative changes.

## 2019-09-02 NOTE — CONSULT NOTE ADULT - PROBLEM SELECTOR RECOMMENDATION 9
Continue tylenol and motrin PRN for pain.  Continue abdominal binder.  Appointment to follow up in outpatient office tomorrow am.    Shanae Espinoza, PGY2  D/W Dr. Hernadez

## 2019-09-02 NOTE — ED PROVIDER NOTE - PHYSICAL EXAMINATION
Gen: Well appearing, NAD  Head: NCAT  HEENT: PERRL, MMM, normal conjunctiva, anicteric, neck supple  Lung: CTAB, no adventitious sounds  CV: RRR, no murmurs  Abd: soft, mildly ttp llq no flucutance palpated no skin changes no hernia palpated, no rebound or guarding, no CVAT  MSK: No edema, no visible deformities  Neuro: Moving all extremity grossly  Skin: Warm and dry, no evidence of rash, well appaering surgical scar scant serous drainage  Psych: normal mood and affect

## 2019-09-02 NOTE — ED PROVIDER NOTE - ATTENDING CONTRIBUTION TO CARE
29 YOF  POD 8 from  here with left sided incision site pain. Reports coughed hard 2 days ago and felt a tearing sensation in incision area. Took tylenol/motrin with minimal relief. Today noted to have a little drainage from left side of incision site. Denies fevers, chills, n/v, cp, sob, abd pain, urinary symptoms, increased vaginal bleeding/discharge, constipation. Called her OB (Dr. Hernadez) yesterday who is planning to follow up with her tomorrow. HD stable, abd soft, incision site clean and dry, left edge of incision with small gap <1cm, does not express discharge, underwear with dried discharge.   AP will get OBgyn consult concern for wound dehiscences. bloodwork/imaging. pain control. dispo pending ob eval.

## 2019-09-02 NOTE — CONSULT NOTE ADULT - SUBJECTIVE AND OBJECTIVE BOX
HPI    Patient is a 29y   POD#8 s/p rLTCS who presents with new onset L sided pain and serous incisional drainage. Per patient while lying in bed on Friday she coughed and immediately felt a L sided tearing pain. The pain, though improved, persisted. She reports it is improved with motrin. However over the past day she also noted a small area of the incision which was leaking small amounts of yellow fluid, particularly when wearing her abdominal binder. Denies any fevers or chills, N/V, CP, SOB, etc. Is passing flatus, urinating without difficult, and tolerating PO. Reports lochia has fluctuated since release from hospital, increasing recently to 1 pad per day at its heaviest.     OB/GYN Provider: Dr. Hernadez    OBHx: Lopez jo C/S @29w for PPROM, 2017 FT rLTCS (on progesterone), 2019 FT rLTCS (on progesterone)  GYNHx: Denies  PMH: Denies  PSH: C/S x3  Rx: Tylenol, Motrin  Psych Hx: Denies  Social Hx: Denies etOH, tobacco, illicit drug use  ROS Negative unless otherwise noted in HPI    Vital Signs Last 24 Hrs  T(C): 36.7 (02 Sep 2019 17:42), Max: 36.8 (02 Sep 2019 16:11)  T(F): 98.1 (02 Sep 2019 17:42), Max: 98.3 (02 Sep 2019 16:11)  HR: 75 (02 Sep 2019 17:42) (75 - 82)  BP: 128/81 (02 Sep 2019 17:42) (128/81 - 133/89)  BP(mean): --  RR: 17 (02 Sep 2019 17:42) (17 - 18)  SpO2: 100% (02 Sep 2019 17:42) (98% - 100%)    PHYSICAL EXAM:  Constitutional: alert and oriented x 3  Breasts: no tenderness, no nodules  Respiratory: CTA bilaterally  Cardiovascular: regular rate and rhythm, S1/S2+  Gastrointestinal: soft, minimally tender over fundus (appropriately), fundus firm  Incision: Small area of erythema over L lateral incision, Serous fluid draining at aforementioned area on expression, Attempts to probe area of drainage unsuccessful, no tracking   Vaginal: Lochia wnl      LABS:                        13.2   10.9  )-----------( 366      ( 02 Sep 2019 18:06 )             40.7     -    139  |  104  |  13  ----------------------------<  85  3.9   |  19<L>  |  0.59    Ca    9.6      02 Sep 2019 18:06    TPro  7.5  /  Alb  4.1  /  TBili  0.5  /  DBili  x   /  AST  16  /  ALT  29  /  AlkPhos  95  09-02      Urinalysis Basic - ( 02 Sep 2019 18:05 )    Color: Colorless / Appearance: Clear / S.005 / pH: x  Gluc: x / Ketone: Trace  / Bili: Negative / Urobili: Negative   Blood: x / Protein: Negative / Nitrite: Negative   Leuk Esterase: Negative / RBC: x / WBC x   Sq Epi: x / Non Sq Epi: x / Bacteria: x        Blood Type: A Negative

## 2019-09-02 NOTE — ED PROVIDER NOTE - CLINICAL SUMMARY MEDICAL DECISION MAKING FREE TEXT BOX
Post op llq pain well appearing surgical scar no hernia or dehiscence. Does not appear infected with no systemic sx.

## 2019-09-02 NOTE — ED PROVIDER NOTE - OBJECTIVE STATEMENT
30yo F no pmx s/p  1 week ago p/w worsening of llq pain over incision site after coughing and felt a tearing sensation by her incision and now having serosanguinous drainage. Has appt with her OB dr. jackson tomorrow AM. Denies fever, chills, cp, sob, vaginal bleeding/discharge, urinary sx.

## 2019-09-02 NOTE — ED PROVIDER NOTE - NSFOLLOWUPINSTRUCTIONS_ED_ALL_ED_FT
- Follow up with your OBGYN tomorrow    - Lab and imaging results, if performed, were discussed with you along with your discharge diagnosis    - Follow up with your doctor in 1 week - bring copies of your results if you were given. If you do not have a primary doctor, please call [phone] to find one convenient for you    - Return to the ED for any new, worsening, or concerning symptoms to you    - Continue all prescribed medications    - Take ibuprofen/tylenol as directed as needed for pain    - Rest and keep yourself hydrated with fluids

## 2019-09-04 LAB
-  AMPICILLIN/SULBACTAM: SIGNIFICANT CHANGE UP
-  CEFAZOLIN: SIGNIFICANT CHANGE UP
-  GENTAMICIN: SIGNIFICANT CHANGE UP
-  OXACILLIN: SIGNIFICANT CHANGE UP
-  PENICILLIN: SIGNIFICANT CHANGE UP
-  RIFAMPIN: SIGNIFICANT CHANGE UP
-  TETRACYCLINE: SIGNIFICANT CHANGE UP
-  TRIMETHOPRIM/SULFAMETHOXAZOLE: SIGNIFICANT CHANGE UP
-  VANCOMYCIN: SIGNIFICANT CHANGE UP
CULTURE RESULTS: SIGNIFICANT CHANGE UP
METHOD TYPE: SIGNIFICANT CHANGE UP
ORGANISM # SPEC MICROSCOPIC CNT: SIGNIFICANT CHANGE UP
ORGANISM # SPEC MICROSCOPIC CNT: SIGNIFICANT CHANGE UP
SPECIMEN SOURCE: SIGNIFICANT CHANGE UP

## 2019-09-05 RX ORDER — CEPHALEXIN 500 MG
1 CAPSULE ORAL
Qty: 14 | Refills: 0
Start: 2019-09-05 | End: 2019-09-11

## 2019-09-05 NOTE — ED POST DISCHARGE NOTE - DETAILS
9/5: Pt contacted - is currently symptomatic with dysuria. Wrote her cephalexin 500mg q12 x 7 days. Pt educated on safety with breastfeeding but risk of infantile diarrhea. - Lokesh Bautista PA-C

## 2019-09-18 ENCOUNTER — INPATIENT (INPATIENT)
Facility: HOSPITAL | Age: 29
LOS: 1 days | Discharge: HOME | DRG: 776 | End: 2019-09-20
Payer: COMMERCIAL

## 2019-09-18 VITALS
DIASTOLIC BLOOD PRESSURE: 76 MMHG | TEMPERATURE: 100 F | SYSTOLIC BLOOD PRESSURE: 114 MMHG | HEART RATE: 122 BPM | WEIGHT: 154.1 LBS | OXYGEN SATURATION: 98 % | HEIGHT: 63 IN | RESPIRATION RATE: 16 BRPM

## 2019-09-18 DIAGNOSIS — Z98.89 OTHER SPECIFIED POSTPROCEDURAL STATES: Chronic | ICD-10-CM

## 2019-09-18 LAB
ALBUMIN SERPL ELPH-MCNC: 4.5 G/DL — SIGNIFICANT CHANGE UP (ref 3.3–5)
ALP SERPL-CCNC: 93 U/L — SIGNIFICANT CHANGE UP (ref 40–120)
ALT FLD-CCNC: 13 U/L — SIGNIFICANT CHANGE UP (ref 10–45)
ANION GAP SERPL CALC-SCNC: 15 MMOL/L — SIGNIFICANT CHANGE UP (ref 5–17)
APPEARANCE UR: CLEAR — SIGNIFICANT CHANGE UP
AST SERPL-CCNC: 14 U/L — SIGNIFICANT CHANGE UP (ref 10–40)
BACTERIA # UR AUTO: NEGATIVE — SIGNIFICANT CHANGE UP
BASE EXCESS BLDV CALC-SCNC: 0.8 MMOL/L — SIGNIFICANT CHANGE UP (ref -2–2)
BASOPHILS # BLD AUTO: 0 K/UL — SIGNIFICANT CHANGE UP (ref 0–0.2)
BASOPHILS NFR BLD AUTO: 0 % — SIGNIFICANT CHANGE UP (ref 0–2)
BILIRUB SERPL-MCNC: 0.7 MG/DL — SIGNIFICANT CHANGE UP (ref 0.2–1.2)
BILIRUB UR-MCNC: NEGATIVE — SIGNIFICANT CHANGE UP
BUN SERPL-MCNC: 9 MG/DL — SIGNIFICANT CHANGE UP (ref 7–23)
CA-I SERPL-SCNC: 1.17 MMOL/L — SIGNIFICANT CHANGE UP (ref 1.12–1.3)
CALCIUM SERPL-MCNC: 9.8 MG/DL — SIGNIFICANT CHANGE UP (ref 8.4–10.5)
CHLORIDE BLDV-SCNC: 107 MMOL/L — SIGNIFICANT CHANGE UP (ref 96–108)
CHLORIDE SERPL-SCNC: 101 MMOL/L — SIGNIFICANT CHANGE UP (ref 96–108)
CO2 BLDV-SCNC: 26 MMOL/L — SIGNIFICANT CHANGE UP (ref 22–30)
CO2 SERPL-SCNC: 22 MMOL/L — SIGNIFICANT CHANGE UP (ref 22–31)
COLOR SPEC: YELLOW — SIGNIFICANT CHANGE UP
CREAT SERPL-MCNC: 0.66 MG/DL — SIGNIFICANT CHANGE UP (ref 0.5–1.3)
DIFF PNL FLD: ABNORMAL
EOSINOPHIL # BLD AUTO: 0.2 K/UL — SIGNIFICANT CHANGE UP (ref 0–0.5)
EOSINOPHIL NFR BLD AUTO: 1.4 % — SIGNIFICANT CHANGE UP (ref 0–6)
EPI CELLS # UR: 2 /HPF — SIGNIFICANT CHANGE UP
GAS PNL BLDV: 138 MMOL/L — SIGNIFICANT CHANGE UP (ref 135–145)
GAS PNL BLDV: SIGNIFICANT CHANGE UP
GAS PNL BLDV: SIGNIFICANT CHANGE UP
GLUCOSE BLDV-MCNC: 106 MG/DL — HIGH (ref 70–99)
GLUCOSE SERPL-MCNC: 101 MG/DL — HIGH (ref 70–99)
GLUCOSE UR QL: NEGATIVE — SIGNIFICANT CHANGE UP
HCG SERPL-ACNC: <2 MIU/ML — SIGNIFICANT CHANGE UP
HCG UR QL: NEGATIVE — SIGNIFICANT CHANGE UP
HCO3 BLDV-SCNC: 25 MMOL/L — SIGNIFICANT CHANGE UP (ref 21–29)
HCT VFR BLD CALC: 42.1 % — SIGNIFICANT CHANGE UP (ref 34.5–45)
HCT VFR BLDA CALC: 45 % — SIGNIFICANT CHANGE UP (ref 39–50)
HGB BLD CALC-MCNC: 14.6 G/DL — SIGNIFICANT CHANGE UP (ref 11.5–15.5)
HGB BLD-MCNC: 14.5 G/DL — SIGNIFICANT CHANGE UP (ref 11.5–15.5)
HYALINE CASTS # UR AUTO: 0 /LPF — SIGNIFICANT CHANGE UP (ref 0–2)
KETONES UR-MCNC: ABNORMAL
LACTATE BLDV-MCNC: 1 MMOL/L — SIGNIFICANT CHANGE UP (ref 0.7–2)
LEUKOCYTE ESTERASE UR-ACNC: ABNORMAL
LIDOCAIN IGE QN: 29 U/L — SIGNIFICANT CHANGE UP (ref 7–60)
LYMPHOCYTES # BLD AUTO: 1.6 K/UL — SIGNIFICANT CHANGE UP (ref 1–3.3)
LYMPHOCYTES # BLD AUTO: 10.2 % — LOW (ref 13–44)
MCHC RBC-ENTMCNC: 31.6 PG — SIGNIFICANT CHANGE UP (ref 27–34)
MCHC RBC-ENTMCNC: 34.4 GM/DL — SIGNIFICANT CHANGE UP (ref 32–36)
MCV RBC AUTO: 91.7 FL — SIGNIFICANT CHANGE UP (ref 80–100)
MONOCYTES # BLD AUTO: 0.7 K/UL — SIGNIFICANT CHANGE UP (ref 0–0.9)
MONOCYTES NFR BLD AUTO: 4.5 % — SIGNIFICANT CHANGE UP (ref 2–14)
NEUTROPHILS # BLD AUTO: 12.8 K/UL — HIGH (ref 1.8–7.4)
NEUTROPHILS NFR BLD AUTO: 83.9 % — HIGH (ref 43–77)
NITRITE UR-MCNC: NEGATIVE — SIGNIFICANT CHANGE UP
PCO2 BLDV: 39 MMHG — SIGNIFICANT CHANGE UP (ref 35–50)
PH BLDV: 7.42 — SIGNIFICANT CHANGE UP (ref 7.35–7.45)
PH UR: 6 — SIGNIFICANT CHANGE UP (ref 5–8)
PLATELET # BLD AUTO: 330 K/UL — SIGNIFICANT CHANGE UP (ref 150–400)
PO2 BLDV: 26 MMHG — SIGNIFICANT CHANGE UP (ref 25–45)
POTASSIUM BLDV-SCNC: 3.5 MMOL/L — SIGNIFICANT CHANGE UP (ref 3.5–5.3)
POTASSIUM SERPL-MCNC: 3.6 MMOL/L — SIGNIFICANT CHANGE UP (ref 3.5–5.3)
POTASSIUM SERPL-SCNC: 3.6 MMOL/L — SIGNIFICANT CHANGE UP (ref 3.5–5.3)
PROT SERPL-MCNC: 7.9 G/DL — SIGNIFICANT CHANGE UP (ref 6–8.3)
PROT UR-MCNC: SIGNIFICANT CHANGE UP
RBC # BLD: 4.59 M/UL — SIGNIFICANT CHANGE UP (ref 3.8–5.2)
RBC # FLD: 11.8 % — SIGNIFICANT CHANGE UP (ref 10.3–14.5)
RBC CASTS # UR COMP ASSIST: 14 /HPF — HIGH (ref 0–4)
SAO2 % BLDV: 44 % — LOW (ref 67–88)
SODIUM SERPL-SCNC: 138 MMOL/L — SIGNIFICANT CHANGE UP (ref 135–145)
SP GR SPEC: 1.02 — SIGNIFICANT CHANGE UP (ref 1.01–1.02)
UROBILINOGEN FLD QL: NEGATIVE — SIGNIFICANT CHANGE UP
WBC # BLD: 15.3 K/UL — HIGH (ref 3.8–10.5)
WBC # FLD AUTO: 15.3 K/UL — HIGH (ref 3.8–10.5)
WBC UR QL: 8 /HPF — HIGH (ref 0–5)

## 2019-09-18 PROCEDURE — 99291 CRITICAL CARE FIRST HOUR: CPT

## 2019-09-18 PROCEDURE — 74177 CT ABD & PELVIS W/CONTRAST: CPT | Mod: 26

## 2019-09-18 RX ORDER — PIPERACILLIN AND TAZOBACTAM 4; .5 G/20ML; G/20ML
3.38 INJECTION, POWDER, LYOPHILIZED, FOR SOLUTION INTRAVENOUS ONCE
Refills: 0 | Status: COMPLETED | OUTPATIENT
Start: 2019-09-18 | End: 2019-09-18

## 2019-09-18 RX ORDER — SODIUM CHLORIDE 9 MG/ML
2200 INJECTION, SOLUTION INTRAVENOUS ONCE
Refills: 0 | Status: COMPLETED | OUTPATIENT
Start: 2019-09-18 | End: 2019-09-18

## 2019-09-18 NOTE — ED ADULT NURSE NOTE - OBJECTIVE STATEMENT
30y/o female presented to the ED from home with complaint of fever. A&Ox3, ambulatory. PMH- s/p c section x5 weeks ago. Patient reports x4 days of fever and chills, Max temp 101F at home. Associated with increased pain at incision site. Denies N/V/D, chest pain, palpations, SOB, numbness, tingling, urinary symptoms.

## 2019-09-18 NOTE — CONSULT NOTE ADULT - SUBJECTIVE AND OBJECTIVE BOX
28 yo  s/p scheduled rLTCS on  p/w fevers, chills x1 day and worsening RLQ pain x4 days. Patient reports incisional pain previously with about 1cm of skin separation of her incision on the left edge that she has been doing dressing changes daily. She reports clear discharge from the incision on this side, but denies drainage of pus, erythema or swelling around incision. She has been treated with Doxycycline for the skin separation outpatient. She has also recently been treated for a UTI and finished a course of Keflex. She currently denies pain in her incision but reports ongoing clear drainage from left skin edge.  Denies dysuria, hematuria, flank pain, n/v. She reports brown to red spotting, but denies abnormal malodorous vaginal discharge. She is breastfeeding currently, denies breast pain, erythema or engorgement. She has not resumed sexual activity since her . She denies LE swelling, pain, CP, SOB, lightheadedness, dizziness.   POBhx   - Classical C/S at 29wks for PPROM ()   - rLTCS ()   - rLTCS (19)   - blighted ovum pregnancy     PGYNhx - denies  PMHx - denies  PSHx - C/S x3   SocHx: no tobacco/alc/drug use  Fam Hx: Dad - HTN  Physical Exam:   General: sitting comfortably in bed, NAD   Neck: supple, full ROM, no lymphadenopathy  CV: RRR  Lungs: CTA b/l, good air flow b/l   Back: No CVA tenderness  Abd: Soft, +RLQ tenderness to palpation, ND, no rebound or guarding   Speculum: scant blood in vaginal vault, no discharge, no CMT, +fundal tenderness   Ext: NT b/l, no edema    Vital Signs Last 24 Hrs  T(C): 37.4 (19 Sep 2019 01:07), Max: 37.6 (18 Sep 2019 20:20)  T(F): 99.3 (19 Sep 2019 01:07), Max: 99.6 (18 Sep 2019 20:20)  HR: 98 (19 Sep 2019 01:07) (98 - 122)  BP: 108/70 (19 Sep 2019 01:07) (108/70 - 114/76)  BP(mean): --  RR: 16 (19 Sep 2019 01:07) (16 - 16)  SpO2: 99% (19 Sep 2019 01:07) (98% - 99%)                            14.5   15.3  )-----------( 330      ( 18 Sep 2019 21:36 )             42.1           138  |  101  |  9   ----------------------------<  101<H>  3.6   |  22  |  0.66    Ca    9.8      18 Sep 2019 21:36    TPro  7.9  /  Alb  4.5  /  TBili  0.7  /  DBili  x   /  AST  14  /  ALT  13  /  AlkPhos  93      LIVER FUNCTIONS - ( 18 Sep 2019 21:36 )  Alb: 4.5 g/dL / Pro: 7.9 g/dL / ALK PHOS: 93 U/L / ALT: 13 U/L / AST: 14 U/L / GGT: x           Urinalysis Basic - ( 18 Sep 2019 22:44 )    Color: Yellow / Appearance: Clear / S.016 / pH: x  Gluc: x / Ketone: Small  / Bili: Negative / Urobili: Negative   Blood: x / Protein: Trace / Nitrite: Negative   Leuk Esterase: Moderate / RBC: 14 /hpf / WBC 8 /HPF   Sq Epi: x / Non Sq Epi: 2 /hpf / Bacteria: Negative    EXAM:  CT ABDOMEN AND PELVIS IC                            PROCEDURE DATE:  2019            INTERPRETATION:  CLINICAL INFORMATION: Right lower quadrant pain. Status   post  5 weeks ago. Fever.    COMPARISON: CT chest and abdominal ultrasound dated 2018.    PROCEDURE:   CT of the Abdomen and Pelvis was performed with intravenous contrast.   Intravenous contrast: 90 ml Omnipaque 350. 10 ml discarded.  Oral contrast: None.  Sagittal and coronal reformats were performed.    FINDINGS:    LOWER CHEST: Within normal limits.    LIVER: Within normal limits.  BILE DUCTS: Normal caliber.  GALLBLADDER: Within normal limits.  SPLEEN: Within normal limits.  PANCREAS: Within normal limits.  ADRENALS: Within normal limits.  KIDNEYS/URETERS: Nonobstructing right intrarenal calculus. No   hydronephrosis. Symmetric parenchymal enhancement.    BLADDER: Question minimal urinary bladder wall thickening.  REPRODUCTIVE ORGANS: Uterus demonstrates heterogeneity in the anterior   lower uterine segment, likely representing postsurgical change related to   recent  section. Small fluid within the endometrial cavity.   Bilateral adnexa are unremarkable.    BOWEL: No bowel obstruction. High-density material identified within the   appendix measuring approximately 2.5 x 0.7 cm, and may represent retained   oral contrast/ingested material versus appendicolith. The more distal   appendix is air-filled without focal wall thickening.  PERITONEUM: Trace pelvic free fluid.  VESSELS: Within normal limits.  RETROPERITONEUM/LYMPH NODES: No lymphadenopathy.    ABDOMINAL WALL: Postsurgical change within the lower abdominal wall,   likely related to  section. No focal drainable fluid collection.   Tiny fat-containing umbilical hernia.  BONES: Within normal limits.    IMPRESSION:     Question of minimal urinary bladder wall thickening. Correlate with   urinalysis to exclude infection.    Postsurgical change within the lower anterior abdominal wall and lower   uterine segment, consistent with recent  section. No focal   drainable fluid collection.    Trace pelvic free fluid.

## 2019-09-18 NOTE — CONSULT NOTE ADULT - ASSESSMENT
30 yo  s/p rLTCS on  presents with fevers at home and abdominal pain. No acute findings or collections on CTAP for source of fever. WBC 15.3 with low grade fevers and tachycardia. Will admit for presumed endometritis.   - Admit to GYN   - Continue Vanc/Zosyn (-)   - AM CBC with diff   - Reg diet, LR @125   - HSQ  - Continue to trend fever curve     Esra Rebekah PGY2  d/w Dr. Hernadez

## 2019-09-18 NOTE — ED STATDOCS - OBJECTIVE STATEMENT
28 y/o F with no significant pmHx and psHx of 3 weeks s/p  presents to the ED c/o of pain at incision site (R side) since four days ago. +chills, fever (tmax: 101), nausea, and minimal vaginal bleeding. Denies vomiting. Last BM was today. 30 y/o F with no significant pmHx and psHx of 5 weeks s/p  presents to the ED c/o of pain at incision site (R side) since four days ago. +chills, fever (tmax: 101), nausea, and minimal vaginal bleeding. Denies vomiting. Last BM was today.

## 2019-09-18 NOTE — ED STATDOCS - NS_ ATTENDINGSCRIBEDETAILS _ED_A_ED_FT
The scribe's documentation has been prepared under my direction and personally reviewed by me in its entirety. I confirm that the note above accurately reflects all work, treatment, procedures, and medical decision making performed by me.  SOPHIA Goetz MD

## 2019-09-18 NOTE — ED STATDOCS - PROGRESS NOTE DETAILS
Attending note (Bishnu): labs reviewed, notable for WBC 15; CT reviewed, read still pending but some free fluid noted; given leukocytosis and tachycardia, now concerned for sepsis possibly from intra-abdominal source, still in WR; have called and spoke with triage and have requesting iv fluids and empiric antibiotics with zosyn.  OBGYN paged.

## 2019-09-18 NOTE — ED STATDOCS - CLINICAL SUMMARY MEDICAL DECISION MAKING FREE TEXT BOX
Attending note (Bishnu): This document reflects a rapid assessment performed in the waiting room during periods of high patient volume, and is a preliminary patient assessment, and does not take the place of or supersede any further medical assessment or treatment.     RLQ abd pain with diffuse abd pain; sent here by her obgyn for CT AP; febrile at home but not febrile here; not septic per initial assessment in waiting room.

## 2019-09-19 DIAGNOSIS — N71.9 INFLAMMATORY DISEASE OF UTERUS, UNSPECIFIED: ICD-10-CM

## 2019-09-19 DIAGNOSIS — R50.9 FEVER, UNSPECIFIED: ICD-10-CM

## 2019-09-19 LAB
BASOPHILS # BLD AUTO: 0.1 K/UL — SIGNIFICANT CHANGE UP (ref 0–0.2)
BASOPHILS NFR BLD AUTO: 0.5 % — SIGNIFICANT CHANGE UP (ref 0–2)
EOSINOPHIL # BLD AUTO: 0.3 K/UL — SIGNIFICANT CHANGE UP (ref 0–0.5)
EOSINOPHIL NFR BLD AUTO: 2.7 % — SIGNIFICANT CHANGE UP (ref 0–6)
HCT VFR BLD CALC: 36.3 % — SIGNIFICANT CHANGE UP (ref 34.5–45)
HGB BLD-MCNC: 12.3 G/DL — SIGNIFICANT CHANGE UP (ref 11.5–15.5)
LYMPHOCYTES # BLD AUTO: 1.7 K/UL — SIGNIFICANT CHANGE UP (ref 1–3.3)
LYMPHOCYTES # BLD AUTO: 13.4 % — SIGNIFICANT CHANGE UP (ref 13–44)
MCHC RBC-ENTMCNC: 31.2 PG — SIGNIFICANT CHANGE UP (ref 27–34)
MCHC RBC-ENTMCNC: 33.9 GM/DL — SIGNIFICANT CHANGE UP (ref 32–36)
MCV RBC AUTO: 92.1 FL — SIGNIFICANT CHANGE UP (ref 80–100)
MONOCYTES # BLD AUTO: 0.7 K/UL — SIGNIFICANT CHANGE UP (ref 0–0.9)
MONOCYTES NFR BLD AUTO: 5.8 % — SIGNIFICANT CHANGE UP (ref 2–14)
NEUTROPHILS # BLD AUTO: 9.7 K/UL — HIGH (ref 1.8–7.4)
NEUTROPHILS NFR BLD AUTO: 77.6 % — HIGH (ref 43–77)
PLATELET # BLD AUTO: 250 K/UL — SIGNIFICANT CHANGE UP (ref 150–400)
RBC # BLD: 3.94 M/UL — SIGNIFICANT CHANGE UP (ref 3.8–5.2)
RBC # FLD: 11.7 % — SIGNIFICANT CHANGE UP (ref 10.3–14.5)
WBC # BLD: 12.4 K/UL — HIGH (ref 3.8–10.5)
WBC # FLD AUTO: 12.4 K/UL — HIGH (ref 3.8–10.5)

## 2019-09-19 RX ORDER — HEPARIN SODIUM 5000 [USP'U]/ML
5000 INJECTION INTRAVENOUS; SUBCUTANEOUS EVERY 12 HOURS
Refills: 0 | Status: DISCONTINUED | OUTPATIENT
Start: 2019-09-19 | End: 2019-09-20

## 2019-09-19 RX ORDER — IBUPROFEN 200 MG
600 TABLET ORAL EVERY 6 HOURS
Refills: 0 | Status: DISCONTINUED | OUTPATIENT
Start: 2019-09-19 | End: 2019-09-20

## 2019-09-19 RX ORDER — PIPERACILLIN AND TAZOBACTAM 4; .5 G/20ML; G/20ML
3.38 INJECTION, POWDER, LYOPHILIZED, FOR SOLUTION INTRAVENOUS EVERY 8 HOURS
Refills: 0 | Status: DISCONTINUED | OUTPATIENT
Start: 2019-09-19 | End: 2019-09-20

## 2019-09-19 RX ORDER — ACETAMINOPHEN 500 MG
975 TABLET ORAL ONCE
Refills: 0 | Status: COMPLETED | OUTPATIENT
Start: 2019-09-19 | End: 2019-09-19

## 2019-09-19 RX ORDER — ACETAMINOPHEN 500 MG
975 TABLET ORAL EVERY 6 HOURS
Refills: 0 | Status: DISCONTINUED | OUTPATIENT
Start: 2019-09-19 | End: 2019-09-20

## 2019-09-19 RX ORDER — SODIUM CHLORIDE 9 MG/ML
1000 INJECTION, SOLUTION INTRAVENOUS
Refills: 0 | Status: DISCONTINUED | OUTPATIENT
Start: 2019-09-19 | End: 2019-09-19

## 2019-09-19 RX ORDER — VANCOMYCIN HCL 1 G
1000 VIAL (EA) INTRAVENOUS EVERY 12 HOURS
Refills: 0 | Status: DISCONTINUED | OUTPATIENT
Start: 2019-09-19 | End: 2019-09-20

## 2019-09-19 RX ORDER — KETOROLAC TROMETHAMINE 30 MG/ML
15 SYRINGE (ML) INJECTION ONCE
Refills: 0 | Status: DISCONTINUED | OUTPATIENT
Start: 2019-09-19 | End: 2019-09-19

## 2019-09-19 RX ADMIN — PIPERACILLIN AND TAZOBACTAM 200 GRAM(S): 4; .5 INJECTION, POWDER, LYOPHILIZED, FOR SOLUTION INTRAVENOUS at 00:36

## 2019-09-19 RX ADMIN — HEPARIN SODIUM 5000 UNIT(S): 5000 INJECTION INTRAVENOUS; SUBCUTANEOUS at 06:21

## 2019-09-19 RX ADMIN — HEPARIN SODIUM 5000 UNIT(S): 5000 INJECTION INTRAVENOUS; SUBCUTANEOUS at 17:27

## 2019-09-19 RX ADMIN — Medication 975 MILLIGRAM(S): at 17:27

## 2019-09-19 RX ADMIN — Medication 975 MILLIGRAM(S): at 08:43

## 2019-09-19 RX ADMIN — Medication 600 MILLIGRAM(S): at 14:33

## 2019-09-19 RX ADMIN — Medication 975 MILLIGRAM(S): at 00:37

## 2019-09-19 RX ADMIN — PIPERACILLIN AND TAZOBACTAM 25 GRAM(S): 4; .5 INJECTION, POWDER, LYOPHILIZED, FOR SOLUTION INTRAVENOUS at 15:57

## 2019-09-19 RX ADMIN — Medication 975 MILLIGRAM(S): at 18:10

## 2019-09-19 RX ADMIN — SODIUM CHLORIDE 2200 MILLILITER(S): 9 INJECTION, SOLUTION INTRAVENOUS at 00:37

## 2019-09-19 RX ADMIN — Medication 975 MILLIGRAM(S): at 09:45

## 2019-09-19 RX ADMIN — Medication 15 MILLIGRAM(S): at 00:39

## 2019-09-19 RX ADMIN — Medication 250 MILLIGRAM(S): at 20:56

## 2019-09-19 RX ADMIN — Medication 250 MILLIGRAM(S): at 06:20

## 2019-09-19 RX ADMIN — PIPERACILLIN AND TAZOBACTAM 25 GRAM(S): 4; .5 INJECTION, POWDER, LYOPHILIZED, FOR SOLUTION INTRAVENOUS at 08:42

## 2019-09-19 RX ADMIN — Medication 600 MILLIGRAM(S): at 12:28

## 2019-09-19 NOTE — H&P ADULT - NSHPLABSRESULTS_GEN_ALL_CORE
Vital Signs Last 24 Hrs  T(C): 37.4 (19 Sep 2019 01:07), Max: 37.6 (18 Sep 2019 20:20)  T(F): 99.3 (19 Sep 2019 01:07), Max: 99.6 (18 Sep 2019 20:20)  HR: 98 (19 Sep 2019 01:07) (98 - 122)  BP: 108/70 (19 Sep 2019 01:07) (108/70 - 114/76)  BP(mean): --  RR: 16 (19 Sep 2019 01:07) (16 - 16)  SpO2: 99% (19 Sep 2019 01:07) (98% - 99%)                            14.5   15.3  )-----------( 330      ( 18 Sep 2019 21:36 )             42.1           138  |  101  |  9   ----------------------------<  101<H>  3.6   |  22  |  0.66    Ca    9.8      18 Sep 2019 21:36    TPro  7.9  /  Alb  4.5  /  TBili  0.7  /  DBili  x   /  AST  14  /  ALT  13  /  AlkPhos  93  -      LIVER FUNCTIONS - ( 18 Sep 2019 21:36 )  Alb: 4.5 g/dL / Pro: 7.9 g/dL / ALK PHOS: 93 U/L / ALT: 13 U/L / AST: 14 U/L / GGT: x             Urinalysis Basic - ( 18 Sep 2019 22:44 )    Color: Yellow / Appearance: Clear / S.016 / pH: x  Gluc: x / Ketone: Small  / Bili: Negative / Urobili: Negative   Blood: x / Protein: Trace / Nitrite: Negative   Leuk Esterase: Moderate / RBC: 14 /hpf / WBC 8 /HPF   Sq Epi: x / Non Sq Epi: 2 /hpf / Bacteria: Negative    INTERPRETATION:  CLINICAL INFORMATION: Right lower quadrant pain. Status   post  5 weeks ago. Fever.    COMPARISON: CT chest and abdominal ultrasound dated 2018.    PROCEDURE:   CT of the Abdomen and Pelvis was performed with intravenous contrast.   Intravenous contrast: 90 ml Omnipaque 350. 10 ml discarded.  Oral contrast: None.  Sagittal and coronal reformats were performed.    FINDINGS:    LOWER CHEST: Within normal limits.    LIVER: Within normal limits.  BILE DUCTS: Normal caliber.  GALLBLADDER: Within normal limits.  SPLEEN: Within normal limits.  PANCREAS: Within normal limits.  ADRENALS: Within normal limits.  KIDNEYS/URETERS: Nonobstructing right intrarenal calculus. No   hydronephrosis. Symmetric parenchymal enhancement.    BLADDER: Question minimal urinary bladder wall thickening.  REPRODUCTIVE ORGANS: Uterus demonstrates heterogeneity in the anterior   lower uterine segment, likely representing postsurgical change related to   recent  section. Small fluid within the endometrial cavity.   Bilateral adnexa are unremarkable.    BOWEL: No bowel obstruction. High-density material identified within the   appendix measuring approximately 2.5 x 0.7 cm, and may represent retained   oral contrast/ingested material versus appendicolith. The more distal   appendix is air-filled without focal wall thickening.  PERITONEUM: Trace pelvic free fluid.  VESSELS: Within normal limits.  RETROPERITONEUM/LYMPH NODES: No lymphadenopathy.    ABDOMINAL WALL: Postsurgical change within the lower abdominal wall,   likely related to  section. No focal drainable fluid collection.   Tiny fat-containing umbilical hernia.  BONES: Within normal limits.    IMPRESSION:     Question of minimal urinary bladder wall thickening. Correlate with   urinalysis to exclude infection.    Postsurgical change within the lower anterior abdominal wall and lower   uterine segment, consistent with recent  section. No focal   drainable fluid collection.    Trace pelvic free fluid.

## 2019-09-19 NOTE — PROGRESS NOTE ADULT - SUBJECTIVE AND OBJECTIVE BOX
Update on Current  Events:   Ms Huynh is a 28 y/o  s/p C/S three weeks ago. She had been treated for left sided infected seroma. She had finished keflex, antibiotics 2 days prior to admission when she developed fever to 101F. This was also had chills and malaize.  She denied problems with the breasts, no foul vaginal discharge or  symptom.  CT of the abd/pelvis: < from: CT Abdomen and Pelvis w/ IV Cont (19 @ 22:03) >  Question of minimal urinary bladder wall thickening. Correlate with   urinalysis to exclude infection.    Postsurgical change within the lower anterior abdominal wall and lower   uterine segment, consistent with recent  section. No focal   drainable fluid collection.  CBC noted WBCWBC Count: 15.3 with a shift to the left.  Lactate: 1.0      Patient was admitted and started on IV antibiotics and admitted for observation.      Fever  MEWS Score  No pertinent past medical history  Fever  Endometritis  H/O  section  FEVER  RAD CDS  16    acetaminophen   Tablet .. 975 milliGRAM(s) Oral every 6 hours PRN  heparin  Injectable 5000 Unit(s) SubCutaneous every 12 hours  ibuprofen  Tablet. 600 milliGRAM(s) Oral every 6 hours PRN  lactated ringers. 1000 milliLiter(s) IV Continuous <Continuous>  piperacillin/tazobactam IVPB.. 3.375 Gram(s) IV Intermittent every 8 hours  vancomycin  IVPB 1000 milliGRAM(s) IV Intermittent every 12 hours      Vital Signs Last 24 Hrs  T(C): 37 (19 Sep 2019 17:00), Max: 37.6 (18 Sep 2019 20:20)  T(F): 98.6 (19 Sep 2019 17:00), Max: 99.6 (18 Sep 2019 20:20)  HR: 80 (19 Sep 2019 17:00) (70 - 122)  BP: 111/60 (19 Sep 2019 17:00) (101/59 - 114/76)  BP(mean): --  RR: 18 (19 Sep 2019 17:00) (16 - 18)  SpO2: 98% (19 Sep 2019 17:00) (97% - 99%)    19 Sep 2019 07:01  -  19 Sep 2019 18:39  --------------------------------------------------------  IN:    IV PiggyBack: 200 mL  Total IN: 200 mL    OUT:  Total OUT: 0 mL    Total NET: 200 mL    PHYSICAL EXAM:  Young W/F W/D W/N in NAD  HEENT: wnl  LUNGS: clear  HT: RR no m   ABD: soft, Nondistended, BS+, incision with small opening on the left minimal ozzing.  uterus 12 weeks minimally tender  Lochia: minimal                         12.3   12.4  )-----------( 250      ( 19 Sep 2019 07:14 )             36.3   18 Sep 2019 21:36    138    |  101    |  9      ----------------------------<  101    3.6     |  22     |  0.66     Ca    9.8        18 Sep 2019 21:36    TPro  7.9    /  Alb  4.5    /  TBili  0.7    /  DBili  x      /  AST  14     /  ALT  13     /  AlkPhos  93     18 Sep 2019 21:36    Patient seen and examined today Plan discussed/Questions answered  (with patient/ancillary staff/colleagues)   Chart notes reviewed.  More detailed Care notes, assessment and plan  will be added later today as needed after reviewing further labs as they become available .  Notable interval events reviewed

## 2019-09-19 NOTE — H&P ADULT - NSHPREVIEWOFSYSTEMS_GEN_ALL_CORE
GENERAL: +fever, chills  HEENT: no throat pain, cough, congestion, dysphagia  CARDIAC: no chest pain, palpitations  PULM: no shortness of breath, cough, wheezing   GI: + RLQ pain, no nausea, vomiting, diarrhea, constipation  : no dysuria, frequency  NEURO: no headache, lightheadedness  MSK: no joint or muscle pain  SKIN: no rashes  HEME: no active bleeding

## 2019-09-19 NOTE — H&P ADULT - NSHPPHYSICALEXAM_GEN_ALL_CORE
Physical Exam:   General: sitting comfortably in bed, NAD   Neck: supple, full ROM, no lymphadenopathy  CV: RRR  Lungs: CTA b/l, good air flow b/l   Back: No CVA tenderness  Abd: Soft, +RLQ tenderness to palpation, ND, no rebound or guarding   Speculum: scant blood in vaginal vault, no discharge, no CMT, +fundal tenderness   Ext: NT b/l, no edema

## 2019-09-19 NOTE — ED PROVIDER NOTE - ATTENDING CONTRIBUTION TO CARE
+Fever. Awake and Alert. Lungs CTA. Heart fast regular. Abdomen soft, +lower abd TTP, ND. CN II-XII grossly intact. Moves all extremities without lateralization. No CVA TTP.    +Sepsis  IV abx for intra-abdominal infection  CT a/p r/o appendicitis, colitis, post-op abscess  r/o endometritis  r/o UTI

## 2019-09-19 NOTE — ED PROVIDER NOTE - OBJECTIVE STATEMENT
28 yo  s/p repeat  on  p/w fevers, chills x1 day and worsening gradual onset persistent dull lower pain x4 days. Pt recently treated for UTI with Keflex and incision infection with doxycycline. No N/V/D. No dysuria. No cough.

## 2019-09-19 NOTE — ED PROVIDER NOTE - PHYSICAL EXAMINATION
CONSTITUTIONAL: Well appearing, well nourished, awake, alert, oriented to person, place, time/situation and in no apparent distress  ENMT: Airway patent  EYES: Clear bilaterally  CARDIAC: Normal rate, regular rhythm.  Heart sounds S1, S2.  No murmurs, rubs or gallops  RESPIRATORY: Breath sounds clear and equal bilaterally.  ABDOMEN: Abdomen soft, +lower abdominal TTP, no guarding, cs incision c/d/i  MUSCULOSKELETAL: Spine appears normal, no deformities, equal active FROM bilaterally  NEUROLOGIC: CN II-XII grossly intact, moves all extremities without lateralization  SKIN: Exposed skin normal color for race, warm, dry and intact

## 2019-09-19 NOTE — H&P ADULT - PROBLEM SELECTOR PLAN 1
- Admit to GYN   - Continue Vanc/Zosyn (9/19-)   - AM CBC with diff   - Reg diet, LR @125   - HSQ  - Continue to trend fever curve     Esra Rebekah PGY2  d/w Dr. Hernadez - Admit to GYN   - Continue Vanc/Zosyn (9/19-)   - AM CBC with diff   - Reg diet, LR @125   - HSQ  - Continue to trend fever curve   - Breast pump     Siria Welch PGY2  d/w Dr. Hernadez - Admit to GYN   - Continue Vanc/Zosyn (9/19-)   - AM CBC with diff   - Reg diet, LR @125   - HSQ  - Continue to trend fever curve   - F/u Bcx, Ucx, Wound cx   - Breast pump     Esra Demirel PGY2  d/w Dr. Hernadez

## 2019-09-19 NOTE — H&P ADULT - HISTORY OF PRESENT ILLNESS
28 yo  s/p scheduled rLTCS on  p/w fevers, chills x1 day and worsening RLQ pain x4 days. Patient reports incisional pain previously with about 1cm of skin separation of her incision on the left edge that she has been doing dressing changes daily. She reports clear discharge from the incision on this side, but denies drainage of pus, erythema or swelling around incision. She has been treated with Doxycycline for the skin separation outpatient. She has also recently been treated for a UTI and finished a course of Keflex. She currently denies pain in her incision but reports ongoing clear drainage from left skin edge.  Denies dysuria, hematuria, flank pain, n/v. She reports brown to red spotting, but denies abnormal malodorous vaginal discharge. She is breastfeeding currently, denies breast pain, erythema or engorgement. She has not resumed sexual activity since her . She denies LE swelling, pain, CP, SOB, lightheadedness, dizziness.     POBhx   - Classical C/S at 29wks for PPROM ()   - rLTCS ()   - rLTCS (19)   - blighted ovum pregnancy     PGYNhx - denies  PMHx - denies  PSHx - C/S x3   SocHx: no tobacco/alc/drug use  Fam Hx: Dad - HTN

## 2019-09-19 NOTE — H&P ADULT - ASSESSMENT
30 yo  s/p rLTCS on  presents with fevers at home and abdominal pain. No acute findings or collections on CTAP for source of fever. WBC 15.3 with low grade fevers and tachycardia. UA neg. Wound cx collected in office (). Will admit for presumed endometritis.

## 2019-09-20 ENCOUNTER — TRANSCRIPTION ENCOUNTER (OUTPATIENT)
Age: 29
End: 2019-09-20

## 2019-09-20 VITALS
RESPIRATION RATE: 18 BRPM | TEMPERATURE: 98 F | DIASTOLIC BLOOD PRESSURE: 66 MMHG | HEART RATE: 71 BPM | OXYGEN SATURATION: 98 % | SYSTOLIC BLOOD PRESSURE: 106 MMHG

## 2019-09-20 LAB
CULTURE RESULTS: SIGNIFICANT CHANGE UP
SPECIMEN SOURCE: SIGNIFICANT CHANGE UP

## 2019-09-20 PROCEDURE — 84702 CHORIONIC GONADOTROPIN TEST: CPT

## 2019-09-20 PROCEDURE — 99285 EMERGENCY DEPT VISIT HI MDM: CPT | Mod: 25

## 2019-09-20 PROCEDURE — 81001 URINALYSIS AUTO W/SCOPE: CPT

## 2019-09-20 PROCEDURE — 84132 ASSAY OF SERUM POTASSIUM: CPT

## 2019-09-20 PROCEDURE — 83605 ASSAY OF LACTIC ACID: CPT

## 2019-09-20 PROCEDURE — 82947 ASSAY GLUCOSE BLOOD QUANT: CPT

## 2019-09-20 PROCEDURE — 80053 COMPREHEN METABOLIC PANEL: CPT

## 2019-09-20 PROCEDURE — 87086 URINE CULTURE/COLONY COUNT: CPT

## 2019-09-20 PROCEDURE — 74177 CT ABD & PELVIS W/CONTRAST: CPT

## 2019-09-20 PROCEDURE — 81025 URINE PREGNANCY TEST: CPT

## 2019-09-20 PROCEDURE — 84295 ASSAY OF SERUM SODIUM: CPT

## 2019-09-20 PROCEDURE — 85027 COMPLETE CBC AUTOMATED: CPT

## 2019-09-20 PROCEDURE — 85014 HEMATOCRIT: CPT

## 2019-09-20 PROCEDURE — 87040 BLOOD CULTURE FOR BACTERIA: CPT

## 2019-09-20 PROCEDURE — 82330 ASSAY OF CALCIUM: CPT

## 2019-09-20 PROCEDURE — 82435 ASSAY OF BLOOD CHLORIDE: CPT

## 2019-09-20 PROCEDURE — 82803 BLOOD GASES ANY COMBINATION: CPT

## 2019-09-20 PROCEDURE — 83690 ASSAY OF LIPASE: CPT

## 2019-09-20 RX ORDER — AZITHROMYCIN 500 MG/1
1 TABLET, FILM COATED ORAL
Qty: 3 | Refills: 0
Start: 2019-09-20 | End: 2019-09-22

## 2019-09-20 RX ADMIN — Medication 250 MILLIGRAM(S): at 08:19

## 2019-09-20 RX ADMIN — Medication 600 MILLIGRAM(S): at 14:16

## 2019-09-20 RX ADMIN — Medication 600 MILLIGRAM(S): at 15:00

## 2019-09-20 RX ADMIN — PIPERACILLIN AND TAZOBACTAM 25 GRAM(S): 4; .5 INJECTION, POWDER, LYOPHILIZED, FOR SOLUTION INTRAVENOUS at 00:12

## 2019-09-20 RX ADMIN — Medication 975 MILLIGRAM(S): at 05:58

## 2019-09-20 RX ADMIN — Medication 975 MILLIGRAM(S): at 14:16

## 2019-09-20 RX ADMIN — Medication 600 MILLIGRAM(S): at 05:58

## 2019-09-20 RX ADMIN — HEPARIN SODIUM 5000 UNIT(S): 5000 INJECTION INTRAVENOUS; SUBCUTANEOUS at 05:59

## 2019-09-20 RX ADMIN — Medication 975 MILLIGRAM(S): at 15:00

## 2019-09-20 RX ADMIN — PIPERACILLIN AND TAZOBACTAM 25 GRAM(S): 4; .5 INJECTION, POWDER, LYOPHILIZED, FOR SOLUTION INTRAVENOUS at 09:58

## 2019-09-20 NOTE — DISCHARGE NOTE OB - MEDICATION SUMMARY - MEDICATIONS TO STOP TAKING
I will STOP taking the medications listed below when I get home from the hospital:    cephalexin 500 mg oral capsule  -- 1 cap(s) by mouth 2 times a day MDD:1000mg  -- Finish all this medication unless otherwise directed by prescriber.

## 2019-09-20 NOTE — DISCHARGE NOTE OB - CARE PROVIDER_API CALL
Tiffanie Hernadez)  Obstetrics and Gynecology  [address] [place] [address]  Phone: [phone]  Fax: [phone]  Follow Up Time: 1 week

## 2019-09-20 NOTE — DISCHARGE NOTE OB - MEDICATION SUMMARY - MEDICATIONS TO TAKE
I will START or STAY ON the medications listed below when I get home from the hospital:    acetaminophen 325 mg oral tablet  -- 3 tab(s) by mouth   -- Indication: For pain    ibuprofen 200 mg oral tablet  -- 3 tab(s) by mouth every 6 hours  -- Indication: For pain    magnesium hydroxide 8% oral suspension  -- 30 milliliter(s) by mouth 2 times a day, As needed, Constipation  -- Indication: For constipation    Prenatal Multivitamins with Folic Acid 1 mg oral tablet  -- 1 tab(s) by mouth once a day  -- Indication: For vitamin    docusate sodium 100 mg oral capsule  -- 1 cap(s) by mouth 2 times a day, As needed, Stool softening  -- Indication: For Stool softner    Azithromycin 3 Day Dose Pack 500 mg oral tablet  -- 1 tab(s) by mouth once a day   -- Do not take dairy products, antacids, or iron preparations within one hour of this medication.  Finish all this medication unless otherwise directed by prescriber.    -- Indication: For antibiotic

## 2019-09-20 NOTE — DISCHARGE NOTE OB - NS OB DC IMMUNIZATIONS2 YN
LOC: The patient is awake, alert and aware of environment with an appropriate affect, the patient is oriented x 3 and speaking appropriately.  APPEARANCE: Patient resting comfortably and in no acute distress, patient is clean and well groomed, patient's clothing is properly fastened.  SKIN: The skin is warm and dry, patient has normal skin turgor and moist mucus membranes, skin intact, no breakdown or brusing noted.  MUSKULOSKELETAL: Patient moving all extremities well, no obvious swelling or deformities noted.  RESPIRATORY: Airway is open and patent, respirations are spontaneous, patient has a normal effort and rate. Breath sounds are clear and equal bilaterally.  CARDIAC: Normal heart sounds. No peripheral edema.  ABDOMEN: Soft and non tender to palpation, no distention noted. Bowel sounds present.  NEURO: right hand grasp weaker than left, previous CVA; no drift noted, no facial droop noted. Speech is clear.     No

## 2019-09-20 NOTE — DISCHARGE NOTE OB - CARE PLAN
Principal Discharge DX:	Endometritis  Goal:	antibiotics  Assessment and plan of treatment:	pain medications, antibiotics abdominal binder

## 2019-09-20 NOTE — DISCHARGE NOTE OB - PATIENT PORTAL LINK FT
You can access the FollowMyHealth Patient Portal offered by [facility] by registering at the following website: [email or web address]. By joining [facility]’s FollowMyHealth portal, you will also be able to view your health information using other applications (apps) compatible with our system.

## 2019-09-20 NOTE — PROVIDER CONTACT NOTE (OTHER) - ASSESSMENT
patient with pain on RLQ tender to the touch. incision intact on R side and open spots on L side that was previously cultured and is covered with guaze. Patient given PO tylenol and motrin for pain and denies any other pain medication at this time.

## 2019-09-20 NOTE — PROGRESS NOTE ADULT - SUBJECTIVE AND OBJECTIVE BOX
R3 OB Progress Note   HD#2, POD#26     S: 30 yo  s/p rLTCS on  presents with fevers at home and abdominal pain with presumed endometritis.     Patient seen and examined at bedside, no acute overnight events. No acute complaints, pain well controlled. Denies any HA, blurry vision, lightheadedness, CP/SOB, N/V. Patient is ambulating, voiding spontaneously, passing flatus, and tolerating regular diet.    Vital Signs Last 24 Hours  T(C): 37.3 (19 @ 00:20), Max: 37.3 (19 @ 00:20)  HR: 82 (19 @ 00:20) (70 - 87)  BP: 112/70 (19 @ 00:20) (101/59 - 112/70)  RR: 18 (19 @ 00:20) (18 - 18)  SpO2: 98% (19 @ 00:20) (97% - 99%)    Physical Exam:  General: NAD  CV: RRR  Pulm: CTAB  Abdomen: Soft, non-tender, non-distended  Incision: Pfannenstiel incision CDI, subcuticular suture closure  Pelvic: Lochia wnl; fundus firm and non-tender   Extremities: no calf tenderness noted on exam    Labs:    Blood Type: A Negative  Antibody Screen: Positive  RPR: Negative               12.3   12.4  )-----------( 250      (  @ 07:14 )             36.3                14.5   15.3  )-----------( 330      (  @ 21:36 )             42.1                13.2   10.9  )-----------( 366      (  @ 18:06 )             40.7         MEDICATIONS  (STANDING):  heparin  Injectable 5000 Unit(s) SubCutaneous every 12 hours  piperacillin/tazobactam IVPB.. 3.375 Gram(s) IV Intermittent every 8 hours  vancomycin  IVPB 1000 milliGRAM(s) IV Intermittent every 12 hours    MEDICATIONS  (PRN):  acetaminophen   Tablet .. 975 milliGRAM(s) Oral every 6 hours PRN Moderate Pain (4 - 6)  ibuprofen  Tablet. 600 milliGRAM(s) Oral every 6 hours PRN Moderate Pain (4 - 6) R3 OB Progress Note   HD#2, POD#26     S: 30 yo  s/p rLTCS on  presents with fevers at home and abdominal pain with presumed endometritis.     Patient seen and examined at bedside, no acute overnight events. Reports not feeling improvement, reports continued fundal tenderness and intermittent chills. Denies any HA, blurry vision, lightheadedness, CP/SOB, N/V. Patient is ambulating, voiding spontaneously, passing flatus, and tolerating regular diet.    Vital Signs Last 24 Hours  T(C): 37.3 (19 @ 00:20), Max: 37.3 (19 @ 00:20)  HR: 82 (19 @ 00:20) (70 - 87)  BP: 112/70 (19 @ 00:20) (101/59 - 112/70)  RR: 18 (19 @ 00:20) (18 - 18)  SpO2: 98% (19 @ 00:20) (97% - 99%)    Physical Exam:  General: NAD  CV: RRR  Pulm: CTAB  Abdomen: Soft, tender at fundus, non-distended  Incision: Pfannenstiel incision CDI, subcuticular suture closure  Pelvic: Lochia wnl; fundus firm and tender to palpation   Extremities: no calf tenderness noted on exam    Labs:    Blood Type: A Negative  Antibody Screen: Positive  RPR: Negative               12.3   12.4  )-----------( 250      (  @ 07:14 )             36.3                14.5   15.3  )-----------( 330      (  @ 21:36 )             42.1                13.2   10.9  )-----------( 366      (  @ 18:06 )             40.7         MEDICATIONS  (STANDING):  heparin  Injectable 5000 Unit(s) SubCutaneous every 12 hours  piperacillin/tazobactam IVPB.. 3.375 Gram(s) IV Intermittent every 8 hours  vancomycin  IVPB 1000 milliGRAM(s) IV Intermittent every 12 hours    MEDICATIONS  (PRN):  acetaminophen   Tablet .. 975 milliGRAM(s) Oral every 6 hours PRN Moderate Pain (4 - 6)  ibuprofen  Tablet. 600 milliGRAM(s) Oral every 6 hours PRN Moderate Pain (4 - 6)

## 2019-09-20 NOTE — DISCHARGE NOTE OB - INSTRUCTIONS
Keep follow up appointment with doctor as instructed and call doctor if fever, chills, recurs and  incision opens up and draining, swollen, reddened and with any questions or concerns. Keep follow up appointment with doctor as instructed and call doctor if fever, chills recur and  incision opens up and draining, swollen, reddened and with any questions or concerns.

## 2019-09-20 NOTE — PROGRESS NOTE ADULT - ASSESSMENT
28 yo  s/p rLTCS on  presents with fevers at home and abdominal pain with presumed endometritis. No acute findings or collections on CTAP for source of fever. WBC on admission 15.3 with low grade fevers and tachycardia. UA neg. Wound cx collected in office ().     Neuro: c/w po pain meds  CV: Hemodynamically stable. AM CBC w. diff pending.   Pulm: Saturating well on room air, encourage incentive spirometry  ID: Continues on vanco/zosyn for presumed endometritis. Remains afebrile since admission. Consider transition to po antibiotics.   GI: c/w regular diet  : voiding spontaneously  Heme: c/w HSQ and SCDs for DVT ppx  Dispo: Continue routine care, possibly transition to po antibiotics.     CARROLL Olguin, PGY-3 30 yo  s/p rLTCS on  presents with fevers at home and abdominal pain with presumed endometritis. No acute findings or collections on CTAP for source of fever. WBC on admission 15.3 with low grade fevers and tachycardia. UA neg. Wound cx collected in office ().     Neuro: c/w po pain meds  CV: Hemodynamically stable. AM CBC w. diff pending.   Pulm: Saturating well on room air, encourage incentive spirometry  ID: Continues on vanco/zosyn for presumed endometritis. Remains afebrile since admission.   GI: c/w regular diet  : voiding spontaneously  Heme: c/w HSQ and SCDs for DVT ppx  Dispo: Continue routine care    CARROLL Olguin, PGY-3

## 2019-09-20 NOTE — DISCHARGE NOTE OB - HOSPITAL COURSE
30 y/o w/f  s/p RCS admitted with abd pains, fever and wound drainage. WBC 15 and lactate was 1.0. CT noted normal postop changes in the pelvis. There were no collections in the subcutaneous tissues. There was noted a mild thickening of the bladder wall.   Pt was started on IV antibiotics and symptoms improved. There is still some tenderness around the incision. She is instructed to take NSAIDS q 6h and azithromycin q daily. F/U in 1 week at the office.

## 2019-09-22 NOTE — DISCHARGE NOTE OB - CRACKED, BLEEDING NIPPLES
Pt Alert and non-verbal, up with assist of 1 and gait belt. Pt is on Po abx VSS. PSC at bedside. LS diminished. Sw following. Plan to discharge back to group home possibly tomorrow. Continue with POC.    Statement Selected

## 2019-09-23 ENCOUNTER — INPATIENT (INPATIENT)
Facility: HOSPITAL | Age: 29
LOS: 2 days | Discharge: HOME | DRG: 776 | End: 2019-09-26
Payer: COMMERCIAL

## 2019-09-23 VITALS
RESPIRATION RATE: 18 BRPM | WEIGHT: 156.09 LBS | HEART RATE: 102 BPM | OXYGEN SATURATION: 99 % | TEMPERATURE: 99 F | DIASTOLIC BLOOD PRESSURE: 84 MMHG | HEIGHT: 63 IN | SYSTOLIC BLOOD PRESSURE: 142 MMHG

## 2019-09-23 DIAGNOSIS — T81.49XA INFECTION FOLLOWING A PROCEDURE, OTHER SURGICAL SITE, INITIAL ENCOUNTER: ICD-10-CM

## 2019-09-23 DIAGNOSIS — Z98.89 OTHER SPECIFIED POSTPROCEDURAL STATES: Chronic | ICD-10-CM

## 2019-09-23 DIAGNOSIS — L08.9 LOCAL INFECTION OF THE SKIN AND SUBCUTANEOUS TISSUE, UNSPECIFIED: ICD-10-CM

## 2019-09-23 LAB
ALBUMIN SERPL ELPH-MCNC: 4.4 G/DL — SIGNIFICANT CHANGE UP (ref 3.3–5)
ALP SERPL-CCNC: 85 U/L — SIGNIFICANT CHANGE UP (ref 40–120)
ALT FLD-CCNC: 16 U/L — SIGNIFICANT CHANGE UP (ref 10–45)
ANION GAP SERPL CALC-SCNC: 15 MMOL/L — SIGNIFICANT CHANGE UP (ref 5–17)
ANTIBODY ID 1_1: SIGNIFICANT CHANGE UP
APTT BLD: 37.6 SEC — HIGH (ref 27.5–36.3)
AST SERPL-CCNC: 17 U/L — SIGNIFICANT CHANGE UP (ref 10–40)
BASOPHILS # BLD AUTO: 0.1 K/UL — SIGNIFICANT CHANGE UP (ref 0–0.2)
BASOPHILS NFR BLD AUTO: 1 % — SIGNIFICANT CHANGE UP (ref 0–2)
BILIRUB SERPL-MCNC: 0.2 MG/DL — SIGNIFICANT CHANGE UP (ref 0.2–1.2)
BLD GP AB SCN SERPL QL: POSITIVE — SIGNIFICANT CHANGE UP
BUN SERPL-MCNC: 11 MG/DL — SIGNIFICANT CHANGE UP (ref 7–23)
CALCIUM SERPL-MCNC: 10 MG/DL — SIGNIFICANT CHANGE UP (ref 8.4–10.5)
CHLORIDE SERPL-SCNC: 106 MMOL/L — SIGNIFICANT CHANGE UP (ref 96–108)
CO2 SERPL-SCNC: 22 MMOL/L — SIGNIFICANT CHANGE UP (ref 22–31)
CREAT SERPL-MCNC: 0.65 MG/DL — SIGNIFICANT CHANGE UP (ref 0.5–1.3)
EOSINOPHIL # BLD AUTO: 0.3 K/UL — SIGNIFICANT CHANGE UP (ref 0–0.5)
EOSINOPHIL NFR BLD AUTO: 4.4 % — SIGNIFICANT CHANGE UP (ref 0–6)
GAS PNL BLDV: SIGNIFICANT CHANGE UP
GLUCOSE SERPL-MCNC: 80 MG/DL — SIGNIFICANT CHANGE UP (ref 70–99)
HCT VFR BLD CALC: 38.5 % — SIGNIFICANT CHANGE UP (ref 34.5–45)
HGB BLD-MCNC: 12.7 G/DL — SIGNIFICANT CHANGE UP (ref 11.5–15.5)
INR BLD: 1.07 RATIO — SIGNIFICANT CHANGE UP (ref 0.88–1.16)
LYMPHOCYTES # BLD AUTO: 1.7 K/UL — SIGNIFICANT CHANGE UP (ref 1–3.3)
LYMPHOCYTES # BLD AUTO: 23.1 % — SIGNIFICANT CHANGE UP (ref 13–44)
MCHC RBC-ENTMCNC: 30.4 PG — SIGNIFICANT CHANGE UP (ref 27–34)
MCHC RBC-ENTMCNC: 32.9 GM/DL — SIGNIFICANT CHANGE UP (ref 32–36)
MCV RBC AUTO: 92.4 FL — SIGNIFICANT CHANGE UP (ref 80–100)
MONOCYTES # BLD AUTO: 0.3 K/UL — SIGNIFICANT CHANGE UP (ref 0–0.9)
MONOCYTES NFR BLD AUTO: 4 % — SIGNIFICANT CHANGE UP (ref 2–14)
NEUTROPHILS # BLD AUTO: 5 K/UL — SIGNIFICANT CHANGE UP (ref 1.8–7.4)
NEUTROPHILS NFR BLD AUTO: 67.5 % — SIGNIFICANT CHANGE UP (ref 43–77)
PLATELET # BLD AUTO: 333 K/UL — SIGNIFICANT CHANGE UP (ref 150–400)
POTASSIUM SERPL-MCNC: 3.8 MMOL/L — SIGNIFICANT CHANGE UP (ref 3.5–5.3)
POTASSIUM SERPL-SCNC: 3.8 MMOL/L — SIGNIFICANT CHANGE UP (ref 3.5–5.3)
PROT SERPL-MCNC: 7.6 G/DL — SIGNIFICANT CHANGE UP (ref 6–8.3)
PROTHROM AB SERPL-ACNC: 12.2 SEC — SIGNIFICANT CHANGE UP (ref 10–12.9)
RBC # BLD: 4.17 M/UL — SIGNIFICANT CHANGE UP (ref 3.8–5.2)
RBC # FLD: 11.4 % — SIGNIFICANT CHANGE UP (ref 10.3–14.5)
RH IG SCN BLD-IMP: NEGATIVE — SIGNIFICANT CHANGE UP
SODIUM SERPL-SCNC: 143 MMOL/L — SIGNIFICANT CHANGE UP (ref 135–145)
WBC # BLD: 7.4 K/UL — SIGNIFICANT CHANGE UP (ref 3.8–10.5)
WBC # FLD AUTO: 7.4 K/UL — SIGNIFICANT CHANGE UP (ref 3.8–10.5)

## 2019-09-23 PROCEDURE — 86077 PHYS BLOOD BANK SERV XMATCH: CPT

## 2019-09-23 PROCEDURE — 99284 EMERGENCY DEPT VISIT MOD MDM: CPT

## 2019-09-23 PROCEDURE — 74177 CT ABD & PELVIS W/CONTRAST: CPT | Mod: 26

## 2019-09-23 RX ORDER — ACETAMINOPHEN 500 MG
975 TABLET ORAL ONCE
Refills: 0 | Status: COMPLETED | OUTPATIENT
Start: 2019-09-23 | End: 2019-09-23

## 2019-09-23 RX ORDER — PIPERACILLIN AND TAZOBACTAM 4; .5 G/20ML; G/20ML
3.38 INJECTION, POWDER, LYOPHILIZED, FOR SOLUTION INTRAVENOUS ONCE
Refills: 0 | Status: COMPLETED | OUTPATIENT
Start: 2019-09-23 | End: 2019-09-23

## 2019-09-23 RX ORDER — PIPERACILLIN AND TAZOBACTAM 4; .5 G/20ML; G/20ML
3.38 INJECTION, POWDER, LYOPHILIZED, FOR SOLUTION INTRAVENOUS ONCE
Refills: 0 | Status: DISCONTINUED | OUTPATIENT
Start: 2019-09-23 | End: 2019-09-24

## 2019-09-23 RX ORDER — ACETAMINOPHEN 500 MG
975 TABLET ORAL EVERY 6 HOURS
Refills: 0 | Status: DISCONTINUED | OUTPATIENT
Start: 2019-09-23 | End: 2019-09-26

## 2019-09-23 RX ORDER — HEPARIN SODIUM 5000 [USP'U]/ML
5000 INJECTION INTRAVENOUS; SUBCUTANEOUS EVERY 12 HOURS
Refills: 0 | Status: DISCONTINUED | OUTPATIENT
Start: 2019-09-23 | End: 2019-09-26

## 2019-09-23 RX ORDER — PIPERACILLIN AND TAZOBACTAM 4; .5 G/20ML; G/20ML
3.38 INJECTION, POWDER, LYOPHILIZED, FOR SOLUTION INTRAVENOUS EVERY 8 HOURS
Refills: 0 | Status: DISCONTINUED | OUTPATIENT
Start: 2019-09-23 | End: 2019-09-24

## 2019-09-23 RX ORDER — SODIUM CHLORIDE 9 MG/ML
1000 INJECTION, SOLUTION INTRAVENOUS
Refills: 0 | Status: DISCONTINUED | OUTPATIENT
Start: 2019-09-23 | End: 2019-09-26

## 2019-09-23 RX ORDER — IBUPROFEN 200 MG
600 TABLET ORAL EVERY 6 HOURS
Refills: 0 | Status: DISCONTINUED | OUTPATIENT
Start: 2019-09-23 | End: 2019-09-26

## 2019-09-23 RX ADMIN — Medication 975 MILLIGRAM(S): at 19:16

## 2019-09-23 RX ADMIN — SODIUM CHLORIDE 125 MILLILITER(S): 9 INJECTION, SOLUTION INTRAVENOUS at 19:17

## 2019-09-23 RX ADMIN — Medication 600 MILLIGRAM(S): at 23:26

## 2019-09-23 RX ADMIN — PIPERACILLIN AND TAZOBACTAM 200 GRAM(S): 4; .5 INJECTION, POWDER, LYOPHILIZED, FOR SOLUTION INTRAVENOUS at 19:16

## 2019-09-23 RX ADMIN — Medication 975 MILLIGRAM(S): at 19:39

## 2019-09-23 NOTE — ED PROVIDER NOTE - PROGRESS NOTE DETAILS
Attending MD Villagomez: Spoke with Dr. Hernadez, reports is concerned for nec fasc because patient's pain level is atypical for 4 weeks post op, also reports she would like a surgical consult for possible debridement and reports it would not be ob/gyn to bring patient back to the OR. Will proceed. Attending MD Villagomez: Call back from Dr. Hernadez, last cultures sensitive to Vanco but resistant to PCN/Zosyn. Attending MD Villagomez: ID consulted, discussed with Dr. Hernadez and waiting for the sensitivities to be faxed to him, will await call back for recs. Attending MD Villagomez: Discussed with ID fellow, received results, it is MSSA, recommended continuing Zosyn 3.375 q 8, blood cultures (done), repeat CT (done), and wound culture (minimal serous drainage)

## 2019-09-23 NOTE — CONSULT NOTE ADULT - SUBJECTIVE AND OBJECTIVE BOX
CC: 29y old Female admitted with a chief complaint of , now redness/swelling around  incision     HPI:  28 y/o  s/p  in August  reports that since  incision has been painful with drainage from the wound. Patient was recently admitted last week for similar pain and fevers at home. She was treated with IV abx last Wed due to concern for endometritis and elevated WBC count. CT scan at that time only showed post-op changes with no collections noted. Patient reports pain has been persistent since surgery and continues to have serous drainage from left side of incision. Reports subjective fevers at home. Denies nausea, vomiting, chest pain, SOB, diarrhea, constipation, recent travel, or recent trauma.     PMHx: No pertinent past medical history    PSHx: H/O  section    Medications (inpatient):   Medications (PRN):  Allergies: No Known Allergies  (Intolerances: )  Social Hx: denies alcohol or tobacco use   Family Hx:     Physical Exam  T(C): 37.5  HR: 87 (87 - 102)  BP: 123/82 (123/82 - 142/84)  RR: 16 (16 - 18)  SpO2: 99% (99% - 99%)  Tmax: T(C): , Max: 37.5 (19 @ 16:41)    General: well developed, well nourished, NAD  Neuro: alert and oriented, no focal deficits, moves all extremities spontaneously  HEENT: NCAT, EOMI, anicteric, mucosa moist  Respiratory: airway patent, respirations unlabored  CVS: regular rate and rhythm  Abdomen: soft, ND, incision erythematous mildly TTP, no active drainage noted from wound, no fluctuance or induration present   Extremities: no edema, sensation and movement grossly intact  Skin: warm, dry, appropriate color    Labs:                        12.7   7.4   )-----------( 333      ( 23 Sep 2019 17:49 )             38.5     PT/INR - ( 23 Sep 2019 17:49 )   PT: 12.2 sec;   INR: 1.07 ratio         PTT - ( 23 Sep 2019 17:49 )  PTT:37.6 sec      143  |  106  |  11  ----------------------------<  80  3.8   |  22  |  0.65    Ca    10.0      23 Sep 2019 17:49    TPro  7.6  /  Alb  4.4  /  TBili  0.2  /  DBili  x   /  AST  17  /  ALT  16  /  AlkPhos  85              Imaging and other studies:  < from: CT Abdomen and Pelvis w/ IV Cont (19 @ 18:01) >      INTERPRETATION:  CLINICAL INFORMATION: Pain at incision site from recent   ..    COMPARISON: CT abdomen and pelvis 2019.    PROCEDURE:   CT of the Abdomen and Pelvis was performed with intravenous contrast.   Intravenous contrast: 90 ml Omnipaque 350. 10 ml discarded.  Oral contrast: None.  Sagittal and coronal reformats were performed.    FINDINGS:    LOWER CHEST: Within normal limits.    LIVER: Within normal limits.  BILE DUCTS: Normal caliber.  GALLBLADDER: Within normal limits.  SPLEEN: Within normal limits.  PANCREAS: Within normal limits.  ADRENALS: Within normal limits.  KIDNEYS/URETERS: Within normal limits.    BLADDER: Within normal limits.  REPRODUCTIVE ORGANS: Post surgical changes status post .    BOWEL: No bowel obstruction. High density material noted within the   appendix, unchanged from prior CT. no appendiceal wall thickening or   fatty stranding.  PERITONEUM: Trace pelvic ascites.  VESSELS: Within normal limits.  RETROPERITONEUM/LYMPH NODES: No lymphadenopathy.    ABDOMINAL WALL: Postsurgical changes status post . No underlying   fluid collection. No subcutaneous emphysema.  BONES: Within normal limits.    IMPRESSION:     Postsurgical changes status post  without underlying collection   or subcutaneous gas.    Unchanged hyperdense material within the appendix without signs of acute   appendicitis.

## 2019-09-23 NOTE — ED ADULT NURSE NOTE - OBJECTIVE STATEMENT
pt is a 29 yr F 3 weeks post partum, presents with redness and swelling to  wound.  Aug 25th, no complications during pregnancy or surgery. pt originally had discharge from L side of surgical wound, was treated with doxycycline, then was having worsening fevers and pressure with urination, treated with keflex that did not resolve symptoms. pt was then admitted, discharged Monday, and treated with IV antibiotics for endometritis. pt followed up today with OBGYN who felt the surgical wound had increased edema and erythema, no active discharge. pt has been taking tylenol and motrin at home. afebrile in ED. OBGYN would like pt to receive a surgical consult for possible debridement of wound. no distress.

## 2019-09-23 NOTE — H&P ADULT - NSHPPHYSICALEXAM_GEN_ALL_CORE
Physical Exam:   General: sitting comfortably in bed, NAD   Neck: supple, full ROM, no lymphadenopathy  CV: RRR  Lungs: CTAB  Back: No CVA tenderness  Abd: soft, nondistended, tender to palpation in right lower quadrant. BS+  Incision: Pfannenstiel incision with 1-2cm of surrounding erythema most concentrated on the right side of the incision, tender to palpation with induration present  Ext: NT b/l, no edema

## 2019-09-23 NOTE — CONSULT NOTE ADULT - ASSESSMENT
28 y/o female s/p  with continued erythema and drainage at previous  site without leukocytosis   CT scan demonstrating post-surgical changes with no collection, low concern for nec. fasc. based on clinical exam     - no surgical intervention indicated   - care per OB/GYN     May follow up with Dr. Lay at outpatient if discharged     Discussed with attending Dr. Lay     [phone]

## 2019-09-23 NOTE — CONSULT NOTE ADULT - SUBJECTIVE AND OBJECTIVE BOX
30 yo  s/p scheduled rLTCS on  presents from clinic with worsening left-sided incisional pain and erythema. Pt was admitted 5 days ago for presumed endometritis having fevers, right lower abdominal pain and leukocytosis to 15.3. CT scan was negative at that time. She was treated with Vanc/Zosyn and discharged on HD#3 on Azithromycin. She was also being treated for a 1cm skin separation of her incision on the left edge as an outpatient. Dr. Hernadez did a wound culture in her office which returned resistant to erythromycin. She denies any changes to the left side of the incision at this time and reports only right sided pain. States the pain starts at her incision and shoots up toward her right rib. States there is worsening redness around the right side of the incision. Denies any fevers/chills at home since being discharged. Denies chest pain, SOB, palpitations, dizziness, HA, lightheadedness.   POBhx   - Classical C/S at 29wks for PPROM ()   - rLTCS ()   - rLTCS (19)   - blighted ovum pregnancy     PGYNhx - denies  PMHx - denies  PSHx - C/S x3   SocHx: no tobacco/alc/drug use  Fam Hx: Dad - HTN      Vital Signs Last 24 Hrs  T(C): 37.5 (23 Sep 2019 16:41), Max: 37.5 (23 Sep 2019 16:41)  T(F): 99.5 (23 Sep 2019 16:41), Max: 99.5 (23 Sep 2019 16:41)  HR: 87 (23 Sep 2019 16:41) (87 - 102)  BP: 123/82 (23 Sep 2019 16:41) (123/82 - 142/84)  BP(mean): --  RR: 16 (23 Sep 2019 16:41) (16 - 18)  SpO2: 99% (23 Sep 2019 16:41) (99% - 99%)    Physical Exam:   General: sitting comfortably in bed, NAD   Neck: supple, full ROM, no lymphadenopathy  CV: RRR  Lungs: CTAB  Back: No CVA tenderness  Abd: soft, nondistended, tender to palpation in right lower quadrant. BS+  Incision: Pfannenstiel incision with 1-2cm of surrounding erythema most concentrated on the right side of the incision, tender to palpation with induration present  Ext: NT b/l, no edema      LABS:                        12.7   7.4   )-----------( 333      ( 23 Sep 2019 17:49 )             38.5         143  |  106  |  11  ----------------------------<  80  3.8   |  22  |  0.65    Ca    10.0      23 Sep 2019 17:49    TPro  7.6  /  Alb  4.4  /  TBili  0.2  /  DBili  x   /  AST  17  /  ALT  16  /  AlkPhos  85      PT/INR - ( 23 Sep 2019 17:49 )   PT: 12.2 sec;   INR: 1.07 ratio         PTT - ( 23 Sep 2019 17:49 )  PTT:37.6 sec

## 2019-09-23 NOTE — CONSULT NOTE ADULT - PROBLEM SELECTOR RECOMMENDATION 9
- f/u CTAP w/ IV Contrast read  - recommend general surgery consult  - agree with ID recs: Vanc/Zosyn, blood cultures, wound cultures  - final recs pending CT results    d/w Dr. Satya Fernandez, PGY-1 - f/u CTAP w/ IV Contrast read  - recommend general surgery consult  - agree with ID recs: Zosyn, blood cultures, wound cultures  - final recs pending CT results    d/w Dr. Satya Fernandez, PGY-1

## 2019-09-23 NOTE — H&P ADULT - ASSESSMENT
28 yo  s/p scheduled rLTCS on  presents with worsening right- sided abdominal pain associated with incisional tenderness, induration and erythema consistent with wound infection. CTAP unchanged from prior admission however given increased abdominal pain will admit for IV antibiotics    -Admit to Ob/Gyn Service  - IV Zosyn  - LR @125  - AM CBC  - f/u Blood Cx/ Wound Cx ()    d/w Satya Fernandez, PGY-1

## 2019-09-23 NOTE — ED PROVIDER NOTE - ATTENDING CONTRIBUTION TO CARE
Attending MD Villagomez: I personally have seen and examined this patient.  Resident note reviewed and agree on plan of care and except where noted.  See below for details.     Seen in [facility], accompanied by mother in law    29F with PMH/PSH including s/p C sec x 3, , denies allergies, denies meds presents to the ED with "my doctor sent me".  Reports had C section 19.  Since the C section L sided seroma that has been draining since.  Reports 19 developed lower abdominal pain and "very tender".  Reports was here until Friday with endometritis on Vanco/Zosyn.  Reports was discharged on a Z pack, last dose yesterday.  Reports Tylenol 2am and Motrin 2am.  Reports saw Dr. Tiffanie Hernadez today.  Reports was told today wound cx from seroma (L) is staph.  Reports was sent in for concern for "necrotizing fasciitis" and a surgical consult.  Reports fever on Wednesday but no fevers since.  Reports chills Thursday.  Denies chest pain, shortness of breath, palpitations. Denies dysuria, hematuria, change in urinary habits including frequency, urgency. Reports still vaginal spotting, reports could use single pad all day.  Reports has pain even when clothes touch abdominal skin.  Reports headache, decreased appetite.  Denies nausea, vomiting, reports had diarrhea after she was given IV abx here, but has since stopped.  Denies blood in stools.  Reports treated for UTI with Keflex and incision infection with doxycycline.  On exam, NAD, head NCAT, PERRL, FROM at neck, no tenderness to midline palpation, no stepoffs along length of spine, lungs CTAB with good inspiratory effort, no wheezing, no rhonchi, no rales, +S1S2, no m/r/g, abdomen soft with +BS, +tenderness at lower abdomen dony-incisional on the R side of Pfannenstiel incision, R side of incision with blanching erythema, tenderness, L side of incision with serous drainage, non tender, no surrounding erythema, ND, no CVAT, moving all extremities with 5/5 strength bilateral upper and lower extremities, good and equal  strength bilaterally, no calf tenderness, swelling, erythema or warmth; A/P: 29F with known Attending MD Villagomez: I personally have seen and examined this patient.  Resident note reviewed and agree on plan of care and except where noted.  See below for details.     Seen in [facility], accompanied by mother in law    29F with PMH/PSH including s/p C sec x 3, , denies allergies, denies meds presents to the ED with "my doctor sent me".  Reports had C section 19.  Since the C section L sided seroma that has been draining since.  Reports 19 developed lower abdominal pain and "very tender".  Reports was here until Friday with endometritis on Vanco/Zosyn.  Reports was discharged on a Z pack, last dose yesterday.  Reports Tylenol 2am and Motrin 2am.  Reports saw Dr. Tiffanie Hernadez today.  Reports was told today wound cx from seroma (L) is staph.  Reports was sent in for concern for "necrotizing fasciitis" and a surgical consult.  Reports fever on Wednesday but no fevers since.  Reports chills Thursday.  Denies chest pain, shortness of breath, palpitations. Denies dysuria, hematuria, change in urinary habits including frequency, urgency. Reports still vaginal spotting, reports could use single pad all day.  Reports has pain even when clothes touch abdominal skin.  Reports headache, decreased appetite.  Denies nausea, vomiting, reports had diarrhea after she was given IV abx here, but has since stopped.  Denies blood in stools.  Reports treated for UTI with Keflex and incision infection with doxycycline.  On exam, NAD, head NCAT, PERRL, FROM at neck, no tenderness to midline palpation, no stepoffs along length of spine, lungs CTAB with good inspiratory effort, no wheezing, no rhonchi, no rales, +S1S2, no m/r/g, abdomen soft with +BS, +tenderness at lower abdomen dony-incisional on the R side of Pfannenstiel incision, R side of incision with blanching erythema, tenderness, L side of incision with serous drainage, non tender, no surrounding erythema, ND, no CVAT, moving all extremities with 5/5 strength bilateral upper and lower extremities, good and equal  strength bilaterally, no calf tenderness, swelling, erythema or warmth; A/P: 29F with known wound infection, sent in for concern for nec fasc, doubt nec fasc based on clinical picture, suspect wound infection, will obtain repeat CT to evaluate collection, will consult surgery as per request of Gyn Dr. Hernadez, will consult Gyn and will discuss with ID for Abx recs, will admit

## 2019-09-23 NOTE — H&P ADULT - HISTORY OF PRESENT ILLNESS
30 yo  s/p scheduled rLTCS on  presents from clinic with worsening left-sided incisional pain and erythema. Pt was admitted 5 days ago for presumed endometritis having fevers, right lower abdominal pain and leukocytosis to 15.3. CT scan was negative at that time. She was treated with Vanc/Zosyn and discharged on HD#3 on Azithromycin. She was also being treated for a 1cm skin separation of her incision on the left edge as an outpatient. Dr. Herandez did a wound culture in her office which returned resistant to erythromycin. She denies any changes to the left side of the incision at this time and reports only right sided pain. States the pain starts at her incision and shoots up toward her right rib. States there is worsening redness around the right side of the incision and reports decreased appetite. Denies any fevers/chills at home since being discharged. Denies chest pain, SOB, palpitations, dizziness, HA, lightheadedness.   POBhx     - Classical C/S at 29wks for PPROM ()   - rLTCS ()   - rLTCS (19)   - blighted ovum pregnancy     PGYNhx - denies  PMHx - denies  PSHx - C/S x3   SocHx: no tobacco/alc/drug use  Fam Hx: Dad - HTN

## 2019-09-23 NOTE — CONSULT NOTE ADULT - ASSESSMENT
30 yo  s/p scheduled rLTCS on  presents with worsening right- sided abdominal pain associated with incisional tenderness, induration and erythema concerning for cellulitis vs. necrotizing soft tissue infection.

## 2019-09-23 NOTE — CHART NOTE - NSCHARTNOTEFT_GEN_A_CORE
ID team contacted for patient with recent  section with wound drainage  - On-call ID Fellow discussed with patient's gynecologist Dr Hernadez and with ED team  - Wound culture results from recent clinic visit faxed over: Cultures growing MSSA  - old labs, vitals and imaging test results reviewed  - patient not examined at this time  ----------  RECOMMENDATIONS:  1. Recommend Zosyn 3.375g IV q8h (will cover MSSA and also empirically provide intra-abdominal coverage, if patient has intra-abdominal collections)  2. Check CT A/P with contrast  3. Check blood cultures, wound cultures  Formal consult note to follow tomorrow morning

## 2019-09-23 NOTE — H&P ADULT - ATTENDING COMMENTS
I saw and examined the pt and discussed the tx plan with the House Staff. I agree with the exam and plan as documented in the above consult.  No evidence of necrotizing fascitis. Incision is mildly swollen and has some skin discoloration.   I do not appreciate need for opening the wound and placing a VAC at this time. Continue observation on ABX.  Dilcia Lay MD

## 2019-09-23 NOTE — H&P ADULT - NSHPLABSRESULTS_GEN_ALL_CORE
12.7   7.4   )-----------( 333      (  @ 17:49 )             38.5         143  |  106  |  11  ----------------------------<  80  3.8   |  22  |  0.65    Ca    10.0      23 Sep 2019 17:49    TPro  7.6  /  Alb  4.4  /  TBili  0.2  /  DBili  x   /  AST  17  /  ALT  16  /  AlkPhos  85      PT/INR - ( 23 Sep 2019 17:49 )   PT: 12.2 sec;   INR: 1.07 ratio         PTT - ( 23 Sep 2019 17:49 )  PTT:37.6 sec    EXAM: CT ABDOMEN AND PELVIS IC       PROCEDURE DATE: 2019           INTERPRETATION: CLINICAL INFORMATION: Pain at incision site from recent   ..     COMPARISON: CT abdomen and pelvis 2019.     PROCEDURE:   CT of the Abdomen and Pelvis was performed with intravenous contrast.   Intravenous contrast: 90 ml Omnipaque 350. 10 ml discarded.   Oral contrast: None.   Sagittal and coronal reformats were performed.     FINDINGS:     LOWER CHEST: Within normal limits.     LIVER: Within normal limits.   BILE DUCTS: Normal caliber.   GALLBLADDER: Within normal limits.   SPLEEN: Within normal limits.   PANCREAS: Within normal limits.   ADRENALS: Within normal limits.   KIDNEYS/URETERS: Within normal limits.     BLADDER: Within normal limits.   REPRODUCTIVE ORGANS: Post surgical changes status post .     BOWEL: No bowel obstruction. High density material noted within the   appendix, unchanged from prior CT. no appendiceal wall thickening or fatty   stranding.   PERITONEUM: Trace pelvic ascites.   VESSELS: Within normal limits.   RETROPERITONEUM/LYMPH NODES: No lymphadenopathy.   ABDOMINAL WALL: Postsurgical changes status post . No underlying   fluid collection. No subcutaneous emphysema.   BONES: Within normal limits.     IMPRESSION:     Postsurgical changes status post  without underlying collection or   subcutaneous gas.     Unchanged hyperdense material within the appendix without signs of acute   appendicitis.

## 2019-09-24 LAB
APPEARANCE UR: CLEAR — SIGNIFICANT CHANGE UP
BASOPHILS # BLD AUTO: 0.1 K/UL — SIGNIFICANT CHANGE UP (ref 0–0.2)
BASOPHILS NFR BLD AUTO: 1.4 % — SIGNIFICANT CHANGE UP (ref 0–2)
BILIRUB UR-MCNC: NEGATIVE — SIGNIFICANT CHANGE UP
COLOR SPEC: SIGNIFICANT CHANGE UP
CULTURE RESULTS: SIGNIFICANT CHANGE UP
CULTURE RESULTS: SIGNIFICANT CHANGE UP
DIFF PNL FLD: ABNORMAL
EOSINOPHIL # BLD AUTO: 0.4 K/UL — SIGNIFICANT CHANGE UP (ref 0–0.5)
EOSINOPHIL NFR BLD AUTO: 6.4 % — HIGH (ref 0–6)
GLUCOSE UR QL: NEGATIVE — SIGNIFICANT CHANGE UP
HCT VFR BLD CALC: 34.2 % — LOW (ref 34.5–45)
HGB BLD-MCNC: 11.1 G/DL — LOW (ref 11.5–15.5)
KETONES UR-MCNC: NEGATIVE — SIGNIFICANT CHANGE UP
LEUKOCYTE ESTERASE UR-ACNC: NEGATIVE — SIGNIFICANT CHANGE UP
LYMPHOCYTES # BLD AUTO: 1.6 K/UL — SIGNIFICANT CHANGE UP (ref 1–3.3)
LYMPHOCYTES # BLD AUTO: 23.1 % — SIGNIFICANT CHANGE UP (ref 13–44)
MCHC RBC-ENTMCNC: 30 PG — SIGNIFICANT CHANGE UP (ref 27–34)
MCHC RBC-ENTMCNC: 32.5 GM/DL — SIGNIFICANT CHANGE UP (ref 32–36)
MCV RBC AUTO: 92.5 FL — SIGNIFICANT CHANGE UP (ref 80–100)
MONOCYTES # BLD AUTO: 0.4 K/UL — SIGNIFICANT CHANGE UP (ref 0–0.9)
MONOCYTES NFR BLD AUTO: 5.9 % — SIGNIFICANT CHANGE UP (ref 2–14)
NEUTROPHILS # BLD AUTO: 4.4 K/UL — SIGNIFICANT CHANGE UP (ref 1.8–7.4)
NEUTROPHILS NFR BLD AUTO: 63.2 % — SIGNIFICANT CHANGE UP (ref 43–77)
NITRITE UR-MCNC: NEGATIVE — SIGNIFICANT CHANGE UP
PH UR: 6.5 — SIGNIFICANT CHANGE UP (ref 5–8)
PLATELET # BLD AUTO: 287 K/UL — SIGNIFICANT CHANGE UP (ref 150–400)
PROT UR-MCNC: SIGNIFICANT CHANGE UP
RBC # BLD: 3.7 M/UL — LOW (ref 3.8–5.2)
RBC # FLD: 11.4 % — SIGNIFICANT CHANGE UP (ref 10.3–14.5)
SP GR SPEC: >1.05 (ref 1.01–1.02)
SPECIMEN SOURCE: SIGNIFICANT CHANGE UP
SPECIMEN SOURCE: SIGNIFICANT CHANGE UP
UROBILINOGEN FLD QL: NEGATIVE — SIGNIFICANT CHANGE UP
WBC # BLD: 7 K/UL — SIGNIFICANT CHANGE UP (ref 3.8–10.5)
WBC # FLD AUTO: 7 K/UL — SIGNIFICANT CHANGE UP (ref 3.8–10.5)

## 2019-09-24 PROCEDURE — 99222 1ST HOSP IP/OBS MODERATE 55: CPT | Mod: GC

## 2019-09-24 RX ORDER — CEFAZOLIN SODIUM 1 G
1000 VIAL (EA) INJECTION EVERY 8 HOURS
Refills: 0 | Status: DISCONTINUED | OUTPATIENT
Start: 2019-09-24 | End: 2019-09-25

## 2019-09-24 RX ORDER — CEFAZOLIN SODIUM 1 G
1000 VIAL (EA) INJECTION ONCE
Refills: 0 | Status: COMPLETED | OUTPATIENT
Start: 2019-09-24 | End: 2019-09-24

## 2019-09-24 RX ORDER — CEFAZOLIN SODIUM 1 G
VIAL (EA) INJECTION
Refills: 0 | Status: DISCONTINUED | OUTPATIENT
Start: 2019-09-24 | End: 2019-09-25

## 2019-09-24 RX ADMIN — HEPARIN SODIUM 5000 UNIT(S): 5000 INJECTION INTRAVENOUS; SUBCUTANEOUS at 19:14

## 2019-09-24 RX ADMIN — Medication 600 MILLIGRAM(S): at 17:41

## 2019-09-24 RX ADMIN — Medication 600 MILLIGRAM(S): at 00:00

## 2019-09-24 RX ADMIN — PIPERACILLIN AND TAZOBACTAM 25 GRAM(S): 4; .5 INJECTION, POWDER, LYOPHILIZED, FOR SOLUTION INTRAVENOUS at 03:01

## 2019-09-24 RX ADMIN — Medication 600 MILLIGRAM(S): at 07:00

## 2019-09-24 RX ADMIN — HEPARIN SODIUM 5000 UNIT(S): 5000 INJECTION INTRAVENOUS; SUBCUTANEOUS at 06:13

## 2019-09-24 RX ADMIN — PIPERACILLIN AND TAZOBACTAM 25 GRAM(S): 4; .5 INJECTION, POWDER, LYOPHILIZED, FOR SOLUTION INTRAVENOUS at 11:27

## 2019-09-24 RX ADMIN — Medication 600 MILLIGRAM(S): at 06:14

## 2019-09-24 RX ADMIN — Medication 100 MILLIGRAM(S): at 16:06

## 2019-09-24 RX ADMIN — Medication 600 MILLIGRAM(S): at 18:15

## 2019-09-24 NOTE — PROGRESS NOTE ADULT - SUBJECTIVE AND OBJECTIVE BOX
Vascular Surgery Progress Note     Subjective/24hour Events:   Patient seen and examined.   No acute events overnight.   Remains afebrile, hemodynamically stable on Zosyn.   Pain controlled.     Vital Signs:  Vital Signs Last 24 Hrs  T(C): 36.8 (24 Sep 2019 09:17), Max: 37.5 (23 Sep 2019 16:41)  T(F): 98.2 (24 Sep 2019 09:17), Max: 99.5 (23 Sep 2019 16:41)  HR: 65 (24 Sep 2019 09:17) (61 - 102)  BP: 102/66 (24 Sep 2019 09:17) (100/65 - 142/84)  BP(mean): 89 (23 Sep 2019 19:20) (89 - 89)  RR: 18 (24 Sep 2019 09:17) (16 - 18)  SpO2: 98% (24 Sep 2019 09:17) (97% - 100%)    CAPILLARY BLOOD GLUCOSE          I&O's Detail      MEDICATIONS  (STANDING):  heparin  Injectable 5000 Unit(s) SubCutaneous every 12 hours  lactated ringers. 1000 milliLiter(s) (125 mL/Hr) IV Continuous <Continuous>  piperacillin/tazobactam IVPB. 3.375 Gram(s) IV Intermittent once  piperacillin/tazobactam IVPB.. 3.375 Gram(s) IV Intermittent every 8 hours    MEDICATIONS  (PRN):  acetaminophen   Tablet .. 975 milliGRAM(s) Oral every 6 hours PRN Moderate Pain (4 - 6)  ibuprofen  Tablet. 600 milliGRAM(s) Oral every 6 hours PRN Moderate Pain (4 - 6)      Physical Exam:  Gen: NAD.  Lungs: Non labored breathing.   Abdomen: soft, ND, incision erythematous mildly TTP, no active drainage noted from wound, no fluctuance or induration present   Ext: Moves all 4 spontaneously.     Labs:    09-23    143  |  106  |  11  ----------------------------<  80  3.8   |  22  |  0.65    Ca    10.0      23 Sep 2019 17:49    TPro  7.6  /  Alb  4.4  /  TBili  0.2  /  DBili  x   /  AST  17  /  ALT  16  /  AlkPhos  85  09-23    LIVER FUNCTIONS - ( 23 Sep 2019 17:49 )  Alb: 4.4 g/dL / Pro: 7.6 g/dL / ALK PHOS: 85 U/L / ALT: 16 U/L / AST: 17 U/L / GGT: x                                 11.1   7.0   )-----------( 287      ( 24 Sep 2019 07:27 )             34.2     PT/INR - ( 23 Sep 2019 17:49 )   PT: 12.2 sec;   INR: 1.07 ratio         PTT - ( 23 Sep 2019 17:49 )  PTT:37.6 sec Surgery Progress Note     Subjective/24hour Events:   Patient seen and examined.   No acute events overnight.   Remains afebrile, hemodynamically stable on Zosyn.   Pain controlled.     Vital Signs:  Vital Signs Last 24 Hrs  T(C): 36.8 (24 Sep 2019 09:17), Max: 37.5 (23 Sep 2019 16:41)  T(F): 98.2 (24 Sep 2019 09:17), Max: 99.5 (23 Sep 2019 16:41)  HR: 65 (24 Sep 2019 09:17) (61 - 102)  BP: 102/66 (24 Sep 2019 09:17) (100/65 - 142/84)  BP(mean): 89 (23 Sep 2019 19:20) (89 - 89)  RR: 18 (24 Sep 2019 09:17) (16 - 18)  SpO2: 98% (24 Sep 2019 09:17) (97% - 100%)    CAPILLARY BLOOD GLUCOSE          I&O's Detail      MEDICATIONS  (STANDING):  heparin  Injectable 5000 Unit(s) SubCutaneous every 12 hours  lactated ringers. 1000 milliLiter(s) (125 mL/Hr) IV Continuous <Continuous>  piperacillin/tazobactam IVPB. 3.375 Gram(s) IV Intermittent once  piperacillin/tazobactam IVPB.. 3.375 Gram(s) IV Intermittent every 8 hours    MEDICATIONS  (PRN):  acetaminophen   Tablet .. 975 milliGRAM(s) Oral every 6 hours PRN Moderate Pain (4 - 6)  ibuprofen  Tablet. 600 milliGRAM(s) Oral every 6 hours PRN Moderate Pain (4 - 6)      Physical Exam:  Gen: NAD.  Lungs: Non labored breathing.   Abdomen: soft, ND, incision erythematous mildly TTP, no active drainage noted from wound, no fluctuance or induration present   Ext: Moves all 4 spontaneously.     Labs:    09-23    143  |  106  |  11  ----------------------------<  80  3.8   |  22  |  0.65    Ca    10.0      23 Sep 2019 17:49    TPro  7.6  /  Alb  4.4  /  TBili  0.2  /  DBili  x   /  AST  17  /  ALT  16  /  AlkPhos  85  09-23    LIVER FUNCTIONS - ( 23 Sep 2019 17:49 )  Alb: 4.4 g/dL / Pro: 7.6 g/dL / ALK PHOS: 85 U/L / ALT: 16 U/L / AST: 17 U/L / GGT: x                                 11.1   7.0   )-----------( 287      ( 24 Sep 2019 07:27 )             34.2     PT/INR - ( 23 Sep 2019 17:49 )   PT: 12.2 sec;   INR: 1.07 ratio         PTT - ( 23 Sep 2019 17:49 )  PTT:37.6 sec

## 2019-09-24 NOTE — PROGRESS NOTE ADULT - ASSESSMENT
30 y/o female s/p  with continued erythema and drainage at previous  site without leukocytosis, CT scan demonstrating post-surgical changes with no collection, low concern for nec. fasc. based on clinical exam     - Monitor abdominal wound.   - No surgical intervention indicated at this time.   - Care per OB/GYN.      May follow up with Dr. Lay at outpatient if discharged     [facility] Pager #[phone]

## 2019-09-24 NOTE — PROGRESS NOTE ADULT - ASSESSMENT
A/P: 28yo  POD#30, HD#2 s/p RLTCS on  who presented with wound infection with outpatient culture positive for MSSA, in stable condition.     Neuro: c/w po pain meds  CV: Hemodynamically stable, AM CBC w/ diff pending   Pulm: Saturating well on room air, encourage incentive spirometry  GI: c/w regular diet  ID: Continue on Zosyn, outpatient wound cx w/ MSSA. Appreciate ID consult.   : voiding spontaneously  Heme: c/w HSQ and SCDs for DVT ppx  Dispo: Continue routine care with IV antibiotics     CARROLL Olguin, PGY-3

## 2019-09-24 NOTE — CONSULT NOTE ADULT - ATTENDING COMMENTS
I saw and examined the pt on 9/24/19 and discussed the tx plan with the House Staff. I agree with the exam and plan as documented in the above consult. Erroneously I made my entry yesterday on the OB H&P.  No evidence of necrotizing fascitis. Incision is mildly swollen and has some skin discoloration.   I do not appreciate need for opening the wound and placing a VAC at this time. Continue observation on ABX.  Dilcia Lay MD
Giana Jaimes  Pager: [phone]. If no response or past 5 pm call [phone].

## 2019-09-24 NOTE — PROGRESS NOTE ADULT - SUBJECTIVE AND OBJECTIVE BOX
Section/Postpartum    MARY RUTH is a  29y woman  , who had repeat C/S~ 4 weeks ago with a wound infection + staph resisitent to Penicillin, erythromycin, and clindymycin. admitted for Iv antibiotics. Pt dah been admitted last  week     PAST MEDICAL & SURGICAL HISTORY:  No pertinent past medical history  H/O  section      Subjective:  The patient feels well.  She is ambulating.   She is tolerating regular diet.  She denies nausea and vomiting.  She is voiding.  Her pain is controlled.  She reports normal postpartum bleeding.  She is breastfeeding.  She is formula feeding.    Physical exam:    Vital Signs Last 24 Hrs  T(C): 36.8 (24 Sep 2019 09:17), Max: 37.5 (23 Sep 2019 16:41)  T(F): 98.2 (24 Sep 2019 09:17), Max: 99.5 (23 Sep 2019 16:41)  HR: 65 (24 Sep 2019 09:17) (61 - 102)  BP: 102/66 (24 Sep 2019 09:17) (100/65 - 142/84)  BP(mean): 89 (23 Sep 2019 19:20) (89 - 89)  RR: 18 (24 Sep 2019 09:17) (16 - 18)  SpO2: 98% (24 Sep 2019 09:17) (97% - 100%)    General: alert and oriented in no acute distress.  Breast: Soft, nontender, not engorged.  Abdomen: Soft, nontender, not distended ,  Uterine fundus is firm and at below the umbilicus, BS (+), Flatus (+), Bowel Movement (+)  Incision: Clean, dry, and intact, bandage has been removed  Pelvic: Normal lochia noted  Ext: No calf tenderness  Lochia: not excessive  Valle draining clear yellow urine    LABS:                        11.1   7.0   )-----------( 287      ( 24 Sep 2019 07:27 )             34.2       Rubella status:      Blood Type:  ABO Interpretation: A ( @ 17:37)     Rh Interpretation: Negative ( @ 17:37)                   Allergies    No Known Allergies    Intolerances        MEDICATIONS  (STANDING):  heparin  Injectable 5000 Unit(s) SubCutaneous every 12 hours  lactated ringers. 1000 milliLiter(s) (125 mL/Hr) IV Continuous <Continuous>  piperacillin/tazobactam IVPB. 3.375 Gram(s) IV Intermittent once  piperacillin/tazobactam IVPB.. 3.375 Gram(s) IV Intermittent every 8 hours    MEDICATIONS  (PRN):  acetaminophen   Tablet .. 975 milliGRAM(s) Oral every 6 hours PRN Moderate Pain (4 - 6)  ibuprofen  Tablet. 600 milliGRAM(s) Oral every 6 hours PRN Moderate Pain (4 - 6)        Assessment and Plan:    POD #    s/p         Doing well.  Encourage ambulation, may shower, routine postop care.  Circumcision today.

## 2019-09-24 NOTE — CONSULT NOTE ADULT - ASSESSMENT
30 yo  s/p scheduled rLTCS on  presented to ED for worsening right sided abdominal pain over incision site. She originally presented to ED on  for fevers/chills, found to have likely endometritis, s/p vanc/Zosyn, discharged on azithromycin. She was followed up by Dr. Hernadez in clinic who obtained wound cultures from the left side of the wound. Cultures growing MSSA. Came back to ED for sharp, "razor-blade" pain over right aspect of incision site. Right side wound appearing erythematous and indurated on exam today. CT A/P with contrast showing no fluid collections, no sign of acute appendicitis. Currently on Zosyn (-).     RECOMMENDATIONS:  -continue with Zosyn 3.375g IV q8h (will cover MSSA and also empirically provide intra-abdominal coverage, if patient has intra-abdominal collections)  -check blood cultures 28 yo  s/p scheduled rLTCS on  presented to ED for worsening right sided abdominal pain over incision site. She originally presented to ED on  for fevers/chills, found to have likely endometritis, s/p vanc/Zosyn, discharged on azithromycin. She was followed up by Dr. Hernadez in clinic who obtained wound cultures from the left side of the wound. Cultures growing MSSA. Came back to ED for sharp, "razor-blade" pain over right aspect of incision site. Right side wound appearing erythematous and indurated on exam today. CT A/P with contrast showing no fluid collections, no sign of acute appendicitis. Currently on Zosyn (-).     RECOMMENDATIONS: 28 yo  s/p scheduled rLTCS on  presented to ED for worsening right sided abdominal pain over incision site. She originally presented to ED on  for fevers/chills, presumed to have endometritis, s/p vanc/Zosyn, discharged on azithromycin. She was followed up by Dr. Hernadez in clinic who obtained wound cultures from the left side of the wound. Cultures growing MSSA. She returned to ED for sharp, "razor-blade" pain over right aspect of incision site, with the right side wound appearing erythematous and indurated on exam today consistent with cellulitis. CT A/P with contrast showing no fluid collections, no sign of acute appendicitis. Currently on Zosyn (-).     RECOMMENDATIONS:  -would recommend 7 day course of cephalexin 500 mg 4 times a day 30 yo  s/p scheduled rLTCS on  presented to ED for worsening right sided abdominal pain over incision site. She originally presented to ED on  for fevers/chills, presumed to have endometritis, s/p vanc/Zosyn, discharged on azithromycin. She was followed up by Dr. Hernadez in clinic who obtained wound cultures from the left side of the wound. Cultures growing MSSA. She returned to ED for sharp, "razor-blade" pain over right aspect of incision site, with the right side wound appearing erythematous and indurated on exam today consistent with cellulitis. CT A/P with contrast showing no fluid collections, no sign of acute appendicitis. Currently on Zosyn (-).     RECOMMENDATIONS:  -would recommend one more day of cefazolin 1 g Q8, then transition to PO cephalexin 500 mg 4 times a day for 7 day course 30 yo  s/p scheduled rLTCS on  presented to ED for worsening right sided abdominal pain over incision site. She originally presented to ED on  for fevers/chills, presumed to have endometritis, s/p vanc/Zosyn, discharged on azithromycin. She was followed up by Dr. Hernadez in clinic who obtained wound cultures from the left side of the wound. Cultures growing MSSA. She returned to ED for sharp, "razor-blade" pain over right aspect of incision site, with the right side wound appearing erythematous and indurated on exam today consistent with cellulitis. CT A/P with contrast showing no fluid collections, no sign of acute appendicitis. Currently on Zosyn (-).     Overall cellulitis, MSSA infection, recent surgery.        RECOMMENDATIONS:  - switch zosyn to cefazolin 1 gm iv q8h.   - can transition to PO cephalexin 500 mg 4 times a day once improving.   - will plan for 7-10 day therapy.

## 2019-09-24 NOTE — PROGRESS NOTE ADULT - SUBJECTIVE AND OBJECTIVE BOX
R3 OB Progress Note   HD#2     S: 30yo  POD#30, HD#2 s/p RLTCS on  who presented with wound infection with outpatient culture positive for MSSA.     Patient seen and examined at bedside, no acute overnight events. No acute complaints, pain well controlled. Denies any HA, blurry vision, lightheadedness, CP/SOB, N/V. Patient is ambulating, voiding spontaneously, passing flatus, and tolerating regular diet.    Vital Signs Last 24 Hours  T(C): 36.8 (19 @ 20:10), Max: 37.5 (19 @ 16:41)  HR: 64 (19 @ 20:10) (64 - 102)  BP: 111/74 (19 @ 20:10) (111/74 - 142/84)  RR: 16 (19 @ 20:10) (16 - 18)  SpO2: 97% (19 @ 20:10) (97% - 99%)    Physical Exam:  General: NAD  CV: RRR  Pulm: CTAB  Abdomen: Soft, non-tender, non-distended  Incision: Pfannenstiel incision with 1-2cm area of erythema and induration on right aspect of incision  Pelvic: Lochia wnl; fundus firm and non-tender   Extremities: no calf tenderness noted on exam    Labs:    Blood Type: A Negative  Antibody Screen: Positive  RPR: Negative               12.7   7.4   )-----------( 333      (  @ 17:49 )             38.5                12.3   12.4  )-----------( 250      (  @ 07:14 )             36.3                14.5   15.3  )-----------( 330      (  @ 21:36 )             42.1         MEDICATIONS  (STANDING):  heparin  Injectable 5000 Unit(s) SubCutaneous every 12 hours  lactated ringers. 1000 milliLiter(s) (125 mL/Hr) IV Continuous <Continuous>  piperacillin/tazobactam IVPB. 3.375 Gram(s) IV Intermittent once  piperacillin/tazobactam IVPB.. 3.375 Gram(s) IV Intermittent every 8 hours    MEDICATIONS  (PRN):  acetaminophen   Tablet .. 975 milliGRAM(s) Oral every 6 hours PRN Moderate Pain (4 - 6)  ibuprofen  Tablet. 600 milliGRAM(s) Oral every 6 hours PRN Moderate Pain (4 - 6) R3 OB Progress Note   HD#2     S: 28yo  POD#30, HD#2 s/p RLTCS on  who presented with wound infection with outpatient culture positive for MSSA.     Patient seen and examined at bedside, no acute overnight events. Reports sharp burning like discomfort when ambulating or with movement. Denies any HA, blurry vision, lightheadedness, CP/SOB, N/V. Patient is ambulating, voiding spontaneously, passing flatus, and tolerating regular diet.    Vital Signs Last 24 Hours  T(C): 36.8 (19 @ 20:10), Max: 37.5 (19 @ 16:41)  HR: 64 (19 @ 20:10) (64 - 102)  BP: 111/74 (19 @ 20:10) (111/74 - 142/84)  RR: 16 (19 @ 20:10) (16 - 18)  SpO2: 97% (19 @ 20:10) (97% - 99%)    Physical Exam:  General: NAD  CV: RRR  Pulm: CTAB  Abdomen: Soft, non-tender, non-distended  Incision: Pfannenstiel incision with 1-2cm area of erythema and induration on left aspect of incision; tender on right aspect of incision   Pelvic: Lochia wnl; fundus firm and non-tender   Extremities: no calf tenderness noted on exam    Labs:    Blood Type: A Negative  Antibody Screen: Positive  RPR: Negative               12.7   7.4   )-----------( 333      (  @ 17:49 )             38.5                12.3   12.4  )-----------( 250      (  @ 07:14 )             36.3                14.5   15.3  )-----------( 330      (  @ 21:36 )             42.1         MEDICATIONS  (STANDING):  heparin  Injectable 5000 Unit(s) SubCutaneous every 12 hours  lactated ringers. 1000 milliLiter(s) (125 mL/Hr) IV Continuous <Continuous>  piperacillin/tazobactam IVPB. 3.375 Gram(s) IV Intermittent once  piperacillin/tazobactam IVPB.. 3.375 Gram(s) IV Intermittent every 8 hours    MEDICATIONS  (PRN):  acetaminophen   Tablet .. 975 milliGRAM(s) Oral every 6 hours PRN Moderate Pain (4 - 6)  ibuprofen  Tablet. 600 milliGRAM(s) Oral every 6 hours PRN Moderate Pain (4 - 6)

## 2019-09-24 NOTE — CONSULT NOTE ADULT - SUBJECTIVE AND OBJECTIVE BOX
Patient is a 29y old  Female who presents with a chief complaint of wound infection (24 Sep 2019 09:29)    HPI:  28 yo  s/p scheduled rLTCS on  presents from clinic with worsening left-sided incisional pain and erythema. Pt was admitted 5 days ago for presumed endometritis having fevers, right lower abdominal pain and leukocytosis to 15.3. CT scan was negative at that time. She was treated with Vanc/Zosyn and discharged on HD#3 on Azithromycin. She was also being treated for a 1cm skin separation of her incision on the left edge as an outpatient. Dr. Hernadez did a wound culture in her office which returned resistant to erythromycin. She denies any changes to the left side of the incision at this time and reports only right sided pain. States the pain starts at her incision and shoots up toward her right rib. States there is worsening redness around the right side of the incision and reports decreased appetite. Denies any fevers/chills at home since being discharged. Denies chest pain, SOB, palpitations, dizziness, HA, lightheadedness.   POBhx     - Classical C/S at 29wks for PPROM ()   - rLTCS ()   - rLTCS (19)   - blighted ovum pregnancy     PGYNhx - denies  PMHx - denies  PSHx - C/S x3   SocHx: no tobacco/alc/drug use  Fam Hx: Dad - HTN (23 Sep 2019 19:00)     prior hospital charts reviewed [  ]  primary team notes reviewed [  ]  other consultant notes reviewed [  ]  PAST MEDICAL & SURGICAL HISTORY:  No pertinent past medical history  H/O  section    Allergies  No Known Allergies    ANTIMICROBIALS (past 90 days)  MEDICATIONS  (STANDING):  piperacillin/tazobactam IVPB.   200 mL/Hr IV Intermittent (19 @ 19:16)    piperacillin/tazobactam IVPB..   25 mL/Hr IV Intermittent (19 @ 03:01)      ANTIMICROBIALS:    piperacillin/tazobactam IVPB. 3.375 once  piperacillin/tazobactam IVPB.. 3.375 every 8 hours    OTHER MEDS: MEDICATIONS  (STANDING):  acetaminophen   Tablet .. 975 every 6 hours PRN  heparin  Injectable 5000 every 12 hours  ibuprofen  Tablet. 600 every 6 hours PRN    SOCIAL HISTORY:   hx smoking  non-smoker    FAMILY HISTORY:    REVIEW OF SYSTEMS  [  ] ROS unobtainable because:    [  ] All other systems negative except as noted below:	    Constitutional:  [ ] fever [ ] chills  [ ] weight loss  [ ] weakness  Skin:  [ ] rash [ ] phlebitis	  Eyes: [ ] icterus [ ] pain  [ ] discharge	  ENMT: [ ] sore throat  [ ] thrush [ ] ulcers [ ] exudates  Respiratory: [ ] dyspnea [ ] hemoptysis [ ] cough [ ] sputum	  Cardiovascular:  [ ] chest pain [ ] palpitations [ ] edema	  Gastrointestinal:  [ ] nausea [ ] vomiting [ ] diarrhea [ ] constipation [ ] pain	  Genitourinary:  [ ] dysuria [ ] frequency [ ] hematuria [ ] discharge [ ] flank pain  [ ] incontinence  Musculoskeletal:  [ ] myalgias [ ] arthralgias [ ] arthritis  [ ] back pain  Neurological:  [ ] headache [ ] seizures  [ ] confusion/altered mental status  Psychiatric:  [ ] anxiety [ ] depression	  Hematology/Lymphatics:  [ ] lymphadenopathy  Endocrine:  [ ] adrenal [ ] thyroid  Allergic/Immunologic:	 [ ] transplant [ ] seasonal    Vital Signs Last 24 Hrs  T(F): 98.2 (19 @ 09:17), Max: 99.7 (19 @ 05:20)  Vital Signs Last 24 Hrs  HR: 65 (19 @ 09:17) (61 - 102)  BP: 102/66 (19 @ 09:17) (100/65 - 142/84)  RR: 18 (19 @ 09:17)  SpO2: 98% (19 @ 09:17) (97% - 100%)  Wt(kg): --    EXAM:  Constitutional: Not in acute distress  Eyes: pupils bilaterally reactive to light. No icterus.  Oral cavity: Clear, no lesions  Neck: No neck vein distension noted  RS: Chest clear to auscultation bilaterally. No wheeze/rhonchi/crepitations.  CVS: S1, S2 heard. Regular rate and rhythm. No murmurs/rubs/gallops.  Abdomen: Soft. No guarding/rigidity/tenderness. Right aspect of lower abdominal incision with erythema and induration, tender to palpation. warm to touch. Left aspect of incision appears non-erythematous, non-indurated, small lesion noted with minimal drainage, not tender. No McBurney tenderness, psoas sign negative.   : No acute abnormalities  Extremities: Warm. No pedal edema  Skin: No lesions noted  Vascular: No evidence of phlebitis  Neuro: Alert, oriented to time/place/person  Cranial nerves 2-12 grossly normal. No focal abnormalities                          11.1   7.0   )-----------( 287      ( 24 Sep 2019 07:27 )             34.2         143  |  106  |  11  ----------------------------<  80  3.8   |  22  |  0.65    Ca    10.0      23 Sep 2019 17:49    TPro  7.6  /  Alb  4.4  /  TBili  0.2  /  DBili  x   /  AST  17  /  ALT  16  /  AlkPhos  85      Urinalysis Basic - ( 24 Sep 2019 07:27 )    Color: Light Yellow / Appearance: Clear / SG: >1.050 / pH: x  Gluc: x / Ketone: Negative  / Bili: Negative / Urobili: Negative   Blood: x / Protein: Trace / Nitrite: Negative   Leuk Esterase: Negative / RBC: 46 /hpf / WBC 3 /HPF   Sq Epi: x / Non Sq Epi: 1 /hpf / Bacteria: Negative    MICROBIOLOGY:    Wound culture (left-side incision site): many Staph aureus, Resistant to clindamycin, erythromycin, penicillin.          RADIOLOGY:  < from: CT Abdomen and Pelvis w/ IV Cont (19 @ 18:01) >  FINDINGS:    LOWER CHEST: Within normal limits.    LIVER: Within normal limits.  BILE DUCTS: Normal caliber.  GALLBLADDER: Within normal limits.  SPLEEN: Within normal limits.  PANCREAS: Within normal limits.  ADRENALS: Within normal limits.  KIDNEYS/URETERS: Within normal limits.    BLADDER: Within normal limits.  REPRODUCTIVE ORGANS: Post surgical changes status post .    BOWEL: No bowel obstruction. High density material noted within the   appendix, unchanged from prior CT. no appendiceal wall thickening or   fatty stranding.  PERITONEUM: Trace pelvic ascites.  VESSELS: Within normal limits.  RETROPERITONEUM/LYMPH NODES: No lymphadenopathy.    ABDOMINAL WALL: Postsurgical changes status post . No underlying   fluid collection. No subcutaneous emphysema.  BONES: Within normal limits.    IMPRESSION:     Postsurgical changes status post  without underlying collection   or subcutaneous gas.    Unchanged hyperdense material within the appendix without signs of acute   appendicitis.    < end of copied text >      < from: CT Abdomen and Pelvis w/ IV Cont (19 @ 22:03) >  FINDINGS:    LOWER CHEST: Within normal limits.    LIVER: Within normal limits.  BILE DUCTS: Normal caliber.  GALLBLADDER: Within normal limits.  SPLEEN: Within normal limits.  PANCREAS: Within normal limits.  ADRENALS: Within normal limits.  KIDNEYS/URETERS: Nonobstructing right intrarenal calculus. No   hydronephrosis. Symmetric parenchymal enhancement.    BLADDER: Question minimal urinary bladder wall thickening.  REPRODUCTIVE ORGANS: Uterus demonstrates heterogeneity in the anterior   lower uterinesegment, likely representing postsurgical change related to   recent  section. Small fluid within the endometrial cavity.   Bilateral adnexa are unremarkable.    BOWEL: No bowel obstruction. High-density material identified within the   appendix measuring approximately 2.5 x 0.7 cm, and may represent retained   oral contrast/ingested material versus appendicolith. The more distal   appendix is air-filled without focal wall thickening.  PERITONEUM: Trace pelvic free fluid.  VESSELS: Withinnormal limits.  RETROPERITONEUM/LYMPH NODES: No lymphadenopathy.    ABDOMINAL WALL: Postsurgical change within the lower abdominal wall,   likely related to  section. No focal drainable fluid collection.   Tiny fat-containing umbilical hernia.  BONES: Within normal limits.    IMPRESSION:     Question of minimal urinary bladder wall thickening. Correlate with   urinalysis to exclude infection.    Postsurgical change within the lower anterior abdominal wall and lower   uterine segment, consistent with recent  section. No focal   drainable fluid collection.    Trace pelvic free fluid.    < end of copied text > Patient is a 29y old  Female who presents with a chief complaint of wound infection (24 Sep 2019 09:29)    HPI:  28 yo  s/p scheduled rLTCS on  presents from clinic with worsening left-sided incisional pain and erythema. Pt was admitted 5 days ago for presumed endometritis having fevers, right lower abdominal pain and leukocytosis to 15.3. CT scan was negative at that time. She was treated with Vanc/Zosyn and discharged on HD#3 on Azithromycin. She was also being treated for a 1cm skin separation of her incision on the left edge as an outpatient. Dr. Hernadez did a wound culture in her office which returned resistant to erythromycin. She denies any changes to the left side of the incision at this time and reports only right sided pain. States the pain starts at her incision and shoots up toward her right rib. States there is worsening redness around the right side of the incision and reports decreased appetite. Denies any fevers/chills at home since being discharged. Denies chest pain, SOB, palpitations, dizziness, HA, lightheadedness.   POBhx     - Classical C/S at 29wks for PPROM ()   - rLTCS ()   - rLTCS (19)   - blighted ovum pregnancy   Above reviewed:   She returned to ED for sharp, "razor-blade" pain over right aspect of incision site, with the right side wound appearing more red. She noted drainage from lt side of the incision in beginning of Sept, was prescribed doxy initially for 2 days, then switched to keflex for 5 days for MSSA in urine. Then received Azithro recently. Pain slightly improved but still concentrated around rt side of incision.       PAST MEDICAL & SURGICAL HISTORY:  No pertinent past medical history  H/O  section    Allergies  No Known Allergies        ANTIMICROBIALS:    piperacillin/tazobactam IVPB. 3.375 once  piperacillin/tazobactam IVPB.. 3.375 every 8 hours    OTHER MEDS: MEDICATIONS  (STANDING):  acetaminophen   Tablet .. 975 every 6 hours PRN  heparin  Injectable 5000 every 12 hours  ibuprofen  Tablet. 600 every 6 hours PRN      SOCIAL HISTORY:  lives with family, non-smoker      FAMILY HISTORY:  Father: HTN      REVIEW OF SYSTEMS  [  ] ROS unobtainable because:    [  x] All other systems negative except as noted below:	    Constitutional:  [x ] fever [x ] chills  [ ] weight loss  [ ] weakness  Skin:  [ ] rash [ ] phlebitis	  Eyes: [ ] icterus [ ] pain  [ ] discharge	  ENT: [ ] sore throat  [ ] thrush [ ] ulcers [ ] exudates  Respiratory: [ ] dyspnea [ ] hemoptysis [ ] cough [ ] sputum	  Cardiovascular:  [ ] chest pain [ ] palpitations [ ] edema	  Gastrointestinal:  [ ] nausea [ ] vomiting [ ] diarrhea [ ] constipation [x ] pain	  Genitourinary:  [ ] dysuria [ ] frequency [ ] hematuria [ ] discharge [ ] flank pain  [ ] incontinence  Musculoskeletal:  [ ] myalgias [ ] arthralgias [ ] arthritis  [ ] back pain  Neurological:  [ ] headache [ ] seizures  [ ] confusion/altered mental status  Psychiatric:  [ ] anxiety [ ] depression	  Endocrine:  [ ] adrenal [ ] thyroid  Allergic/Immunologic:	 [ ] transplant [ ] seasonal      Vital Signs Last 24 Hrs  T(F): 98.2 (19 @ 09:17), Max: 99.7 (19 @ 05:20)  Vital Signs Last 24 Hrs  HR: 65 (19 @ 09:17) (61 - 102)  BP: 102/66 (19 @ 09:17) (100/65 - 142/84)  RR: 18 (19 @ 09:17)  SpO2: 98% (19 @ 09:17) (97% - 100%)  Wt(kg): --    EXAM:  Constitutional: Not in acute distress  Eyes: pupils bilaterally reactive to light.   Oral cavity: Clear, no lesions  Neck: No neck vein distension noted  RS: Chest clear to auscultation bilaterally.   CVS: S1, S2 heard. Regular rate and rhythm.  Abdomen: Soft. Right aspect of lower abdominal incision with erythema and induration, tender to palpation. warm to touch. Left aspect of incision appears non-erythematous, non-indurated, small opening noted with minimal drainage, not tender. No McBurney tenderness, psoas sign negative.   : No acute abnormalities  Extremities: Warm. No pedal edema  Skin: No lesions noted  Vascular: No evidence of phlebitis  Neuro: Alert, oriented to time/place/person. No focal abnormalities                          11.1   7.0   )-----------( 287      ( 24 Sep 2019 07:27 )             34.2     09-    143  |  106  |  11  ----------------------------<  80  3.8   |  22  |  0.65    Ca    10.0      23 Sep 2019 17:49    TPro  7.6  /  Alb  4.4  /  TBili  0.2  /  DBili  x   /  AST  17  /  ALT  16  /  AlkPhos  85      Urinalysis Basic - ( 24 Sep 2019 07:27 )    Color: Light Yellow / Appearance: Clear / SG: >1.050 / pH: x  Gluc: x / Ketone: Negative  / Bili: Negative / Urobili: Negative   Blood: x / Protein: Trace / Nitrite: Negative   Leuk Esterase: Negative / RBC: 46 /hpf / WBC 3 /HPF   Sq Epi: x / Non Sq Epi: 1 /hpf / Bacteria: Negative    MICROBIOLOGY:    Wound culture (left-side incision site): many Staph aureus, Resistant to clindamycin, erythromycin, penicillin.          RADIOLOGY:  < from: CT Abdomen and Pelvis w/ IV Cont (19 @ 18:01) >  FINDINGS:    LOWER CHEST: Within normal limits.    LIVER: Within normal limits.  BILE DUCTS: Normal caliber.  GALLBLADDER: Within normal limits.  SPLEEN: Within normal limits.  PANCREAS: Within normal limits.  ADRENALS: Within normal limits.  KIDNEYS/URETERS: Within normal limits.    BLADDER: Within normal limits.  REPRODUCTIVE ORGANS: Post surgical changes status post .    BOWEL: No bowel obstruction. High density material noted within the   appendix, unchanged from prior CT. no appendiceal wall thickening or   fatty stranding.  PERITONEUM: Trace pelvic ascites.  VESSELS: Within normal limits.  RETROPERITONEUM/LYMPH NODES: No lymphadenopathy.    ABDOMINAL WALL: Postsurgical changes status post . No underlying   fluid collection. No subcutaneous emphysema.  BONES: Within normal limits.    IMPRESSION:     Postsurgical changes status post  without underlying collection   or subcutaneous gas.    Unchanged hyperdense material within the appendix without signs of acute   appendicitis.          < from: CT Abdomen and Pelvis w/ IV Cont (19 @ 22:03) >  FINDINGS:    LOWER CHEST: Within normal limits.    LIVER: Within normal limits.  BILE DUCTS: Normal caliber.  GALLBLADDER: Within normal limits.  SPLEEN: Within normal limits.  PANCREAS: Within normal limits.  ADRENALS: Within normal limits.  KIDNEYS/URETERS: Nonobstructing right intrarenal calculus. No   hydronephrosis. Symmetric parenchymal enhancement.    BLADDER: Question minimal urinary bladder wall thickening.  REPRODUCTIVE ORGANS: Uterus demonstrates heterogeneity in the anterior   lower uterinesegment, likely representing postsurgical change related to   recent  section. Small fluid within the endometrial cavity.   Bilateral adnexa are unremarkable.    BOWEL: No bowel obstruction. High-density material identified within the   appendix measuring approximately 2.5 x 0.7 cm, and may represent retained   oral contrast/ingested material versus appendicolith. The more distal   appendix is air-filled without focal wall thickening.  PERITONEUM: Trace pelvic free fluid.  VESSELS: Withinnormal limits.  RETROPERITONEUM/LYMPH NODES: No lymphadenopathy.    ABDOMINAL WALL: Postsurgical change within the lower abdominal wall,   likely related to  section. No focal drainable fluid collection.   Tiny fat-containing umbilical hernia.  BONES: Within normal limits.    IMPRESSION:     Question of minimal urinary bladder wall thickening. Correlate with   urinalysis to exclude infection.    Postsurgical change within the lower anterior abdominal wall and lower   uterine segment, consistent with recent  section. No focal   drainable fluid collection.    Trace pelvic free fluid.

## 2019-09-25 LAB
BASOPHILS # BLD AUTO: 0.1 K/UL — SIGNIFICANT CHANGE UP (ref 0–0.2)
BASOPHILS NFR BLD AUTO: 1 % — SIGNIFICANT CHANGE UP (ref 0–2)
CULTURE RESULTS: NO GROWTH — SIGNIFICANT CHANGE UP
EOSINOPHIL # BLD AUTO: 0.5 K/UL — SIGNIFICANT CHANGE UP (ref 0–0.5)
EOSINOPHIL NFR BLD AUTO: 6.6 % — HIGH (ref 0–6)
HCT VFR BLD CALC: 38 % — SIGNIFICANT CHANGE UP (ref 34.5–45)
HGB BLD-MCNC: 12.4 G/DL — SIGNIFICANT CHANGE UP (ref 11.5–15.5)
LYMPHOCYTES # BLD AUTO: 1.8 K/UL — SIGNIFICANT CHANGE UP (ref 1–3.3)
LYMPHOCYTES # BLD AUTO: 26.8 % — SIGNIFICANT CHANGE UP (ref 13–44)
MCHC RBC-ENTMCNC: 30.1 PG — SIGNIFICANT CHANGE UP (ref 27–34)
MCHC RBC-ENTMCNC: 32.8 GM/DL — SIGNIFICANT CHANGE UP (ref 32–36)
MCV RBC AUTO: 91.8 FL — SIGNIFICANT CHANGE UP (ref 80–100)
MONOCYTES # BLD AUTO: 0.4 K/UL — SIGNIFICANT CHANGE UP (ref 0–0.9)
MONOCYTES NFR BLD AUTO: 5.2 % — SIGNIFICANT CHANGE UP (ref 2–14)
NEUTROPHILS # BLD AUTO: 4.1 K/UL — SIGNIFICANT CHANGE UP (ref 1.8–7.4)
NEUTROPHILS NFR BLD AUTO: 60.3 % — SIGNIFICANT CHANGE UP (ref 43–77)
PLATELET # BLD AUTO: 296 K/UL — SIGNIFICANT CHANGE UP (ref 150–400)
RBC # BLD: 4.14 M/UL — SIGNIFICANT CHANGE UP (ref 3.8–5.2)
RBC # FLD: 11.4 % — SIGNIFICANT CHANGE UP (ref 10.3–14.5)
SPECIMEN SOURCE: SIGNIFICANT CHANGE UP
WBC # BLD: 6.8 K/UL — SIGNIFICANT CHANGE UP (ref 3.8–10.5)
WBC # FLD AUTO: 6.8 K/UL — SIGNIFICANT CHANGE UP (ref 3.8–10.5)

## 2019-09-25 PROCEDURE — 99232 SBSQ HOSP IP/OBS MODERATE 35: CPT

## 2019-09-25 RX ORDER — CEPHALEXIN 500 MG
500 CAPSULE ORAL
Refills: 0 | Status: DISCONTINUED | OUTPATIENT
Start: 2019-09-25 | End: 2019-09-26

## 2019-09-25 RX ADMIN — Medication 1 APPLICATION(S): at 14:39

## 2019-09-25 RX ADMIN — Medication 100 MILLIGRAM(S): at 08:42

## 2019-09-25 RX ADMIN — Medication 975 MILLIGRAM(S): at 00:26

## 2019-09-25 RX ADMIN — Medication 100 MILLIGRAM(S): at 00:25

## 2019-09-25 RX ADMIN — Medication 975 MILLIGRAM(S): at 01:20

## 2019-09-25 RX ADMIN — HEPARIN SODIUM 5000 UNIT(S): 5000 INJECTION INTRAVENOUS; SUBCUTANEOUS at 10:11

## 2019-09-25 RX ADMIN — HEPARIN SODIUM 5000 UNIT(S): 5000 INJECTION INTRAVENOUS; SUBCUTANEOUS at 18:50

## 2019-09-25 RX ADMIN — Medication 100 MILLIGRAM(S): at 16:36

## 2019-09-25 NOTE — PROGRESS NOTE ADULT - ASSESSMENT
30 yo  s/p scheduled rLTCS on  presented to ED for worsening right sided abdominal pain over incision site. She originally presented to ED on  for fevers/chills, presumed to have endometritis, s/p vanc/Zosyn, discharged on azithromycin. She was followed up by Dr. Hernadez in clinic who obtained wound cultures from the left side of the wound. Cultures growing MSSA. She returned to ED for sharp, "razor-blade" pain over right aspect of incision site, with the right side wound appearing erythematous and indurated on exam today consistent with cellulitis. CT A/P with contrast showing no fluid collections, no sign of acute appendicitis. Currently on Zosyn (-).     Overall cellulitis, MSSA infection, recent surgery.    clinically improving.       RECOMMENDATIONS:  - continue with cefazolin 1 gm iv q8h while inpt    - can transition to PO cephalexin 500 mg 4 times a day on discharge    - will plan for 10 day therapy total until 10/3/19.

## 2019-09-25 NOTE — PROGRESS NOTE ADULT - SUBJECTIVE AND OBJECTIVE BOX
R3 OB Note   HD#3, POD#31    S: 30yo  POD#31, HD#3 s/p RLTCS on  who presented with wound infection with outpatient culture positive for MSSA.     Patient seen and examined at bedside, no acute overnight events. Reports incisional discomfort improved. Denies any HA, blurry vision, lightheadedness, CP/SOB, N/V. Patient is ambulating, voiding spontaneously, passing flatus, and tolerating regular diet.     Vital Signs Last 24 Hours  T(C): 36.6 (19 @ 00:59), Max: 36.8 (19 @ 09:17)  HR: 64 (19 @ 00:59) (56 - 73)  BP: 114/74 (19 @ 00:59) (102/66 - 114/74)  RR: 18 (19 @ 00:59) (18 - 18)  SpO2: 98% (19 @ 21:13) (97% - 98%)    Physical Exam:  General: NAD  CV: RRR  Pulm: CTAB  Abdomen: Soft, non-tender, non-distended  Incision: Pfannenstiel incision with 1-2cm area of erythema and induration on left aspect of incision; tender on right aspect of incision   Pelvic: Lochia wnl; fundus firm and non-tender   Extremities: no calf tenderness noted on exam    Labs:    Blood Type: A Negative  Antibody Screen: Positive               11.1   7.0   )-----------( 287      (  @ 07:27 )             34.2                12.7   7.4   )-----------( 333      (  @ 17:49 )             38.5                12.3   12.4  )-----------( 250      (  @ 07:14 )             36.3         MEDICATIONS  (STANDING):  ceFAZolin   IVPB      ceFAZolin   IVPB 1000 milliGRAM(s) IV Intermittent every 8 hours  heparin  Injectable 5000 Unit(s) SubCutaneous every 12 hours  lactated ringers. 1000 milliLiter(s) (125 mL/Hr) IV Continuous <Continuous>    MEDICATIONS  (PRN):  acetaminophen   Tablet .. 975 milliGRAM(s) Oral every 6 hours PRN Moderate Pain (4 - 6)  ibuprofen  Tablet. 600 milliGRAM(s) Oral every 6 hours PRN Moderate Pain (4 - 6)

## 2019-09-25 NOTE — PROGRESS NOTE ADULT - ASSESSMENT
30yo  POD#31, HD#3 s/p RLTCS on  who presented with wound infection with outpatient culture positive for MSSA, in stable condition.     Neuro: c/w po pain meds  CV: Hemodynamically stable, AM CBC w/ diff pending   Pulm: Saturating well on room air, encourage incentive spirometry  GI: c/w regular diet  ID: Continue on Ancef, now s/p zosyn. Outpatient wound cx w/ MSSA. Appreciate ID consult. Appreciate surgery consult. Blood and urine cultures () pending.  : voiding spontaneously  Heme: c/w HSQ and SCDs for DVT ppx  Dispo: Continue routine care with IV antibiotics     CARROLL Olguin, PGY-3

## 2019-09-25 NOTE — PROGRESS NOTE ADULT - SUBJECTIVE AND OBJECTIVE BOX
29y old  Female who presents with a chief complaint of wound infection (25 Sep 2019 05:31)      Interval history:  Afebrile, pain improved but persists. Otherwise denies any complains.       No Known Allergies      Antimicrobials:  cephalexin 500 milliGRAM(s) Oral four times a day      REVIEW OF SYSTEMS:  No chest pain   No cough, no SOB  No N/V  No dysuria   No rash.       Vital Signs Last 24 Hrs  T(C): 37.1 (09-25-19 @ 16:54), Max: 37.1 (09-25-19 @ 16:54)  T(F): 98.8 (09-25-19 @ 16:54), Max: 98.8 (09-25-19 @ 16:54)  HR: 55 (09-25-19 @ 16:54) (55 - 69)  BP: 113/73 (09-25-19 @ 16:54) (101/64 - 120/87)  BP(mean): --  RR: 18 (09-25-19 @ 16:54) (18 - 18)  SpO2: 100% (09-25-19 @ 16:54) (96% - 100%)      PHYSICAL EXAM:  Patient in no acute distress. AAOX3.  No icterus, no oral ulcers.  Cardiovascular: S1S2 normal.  Lungs: Good air entry B/L lung fields.  Gastrointestinal: soft, Right aspect of lower abdominal incision with erythema and induration, tender to palpation, warm to touch but improved. Left aspect of incision appears non-erythematous, non-indurated, small opening noted with minimal drainage.   Extremities: no edema.  IV sites not inflamed.                           12.4   6.8   )-----------( 296      ( 25 Sep 2019 06:47 )             38.0         Culture - Urine (collected 24 Sep 2019 17:22)  Source: .Urine  Final Report (25 Sep 2019 14:52):    No growth    Culture - Blood (collected 23 Sep 2019 22:23)  Source: .Blood  Preliminary Report (24 Sep 2019 23:01):    No growth to date.    Culture - Blood (collected 23 Sep 2019 21:32)  Source: .Blood  Preliminary Report (24 Sep 2019 22:01):    No growth to date.

## 2019-09-26 ENCOUNTER — TRANSCRIPTION ENCOUNTER (OUTPATIENT)
Age: 29
End: 2019-09-26

## 2019-09-26 VITALS
RESPIRATION RATE: 18 BRPM | TEMPERATURE: 98 F | DIASTOLIC BLOOD PRESSURE: 72 MMHG | HEART RATE: 54 BPM | OXYGEN SATURATION: 99 % | SYSTOLIC BLOOD PRESSURE: 107 MMHG

## 2019-09-26 DIAGNOSIS — T81.49XA INFECTION FOLLOWING A PROCEDURE, OTHER SURGICAL SITE, INITIAL ENCOUNTER: ICD-10-CM

## 2019-09-26 LAB
BASOPHILS # BLD AUTO: 0.1 K/UL — SIGNIFICANT CHANGE UP (ref 0–0.2)
BASOPHILS NFR BLD AUTO: 0.9 % — SIGNIFICANT CHANGE UP (ref 0–2)
EOSINOPHIL # BLD AUTO: 0.5 K/UL — SIGNIFICANT CHANGE UP (ref 0–0.5)
EOSINOPHIL NFR BLD AUTO: 6.6 % — HIGH (ref 0–6)
HCT VFR BLD CALC: 37.6 % — SIGNIFICANT CHANGE UP (ref 34.5–45)
HGB BLD-MCNC: 12.6 G/DL — SIGNIFICANT CHANGE UP (ref 11.5–15.5)
LYMPHOCYTES # BLD AUTO: 2 K/UL — SIGNIFICANT CHANGE UP (ref 1–3.3)
LYMPHOCYTES # BLD AUTO: 27 % — SIGNIFICANT CHANGE UP (ref 13–44)
MCHC RBC-ENTMCNC: 30.8 PG — SIGNIFICANT CHANGE UP (ref 27–34)
MCHC RBC-ENTMCNC: 33.6 GM/DL — SIGNIFICANT CHANGE UP (ref 32–36)
MCV RBC AUTO: 91.7 FL — SIGNIFICANT CHANGE UP (ref 80–100)
MONOCYTES # BLD AUTO: 0.3 K/UL — SIGNIFICANT CHANGE UP (ref 0–0.9)
MONOCYTES NFR BLD AUTO: 4.6 % — SIGNIFICANT CHANGE UP (ref 2–14)
NEUTROPHILS # BLD AUTO: 4.5 K/UL — SIGNIFICANT CHANGE UP (ref 1.8–7.4)
NEUTROPHILS NFR BLD AUTO: 61 % — SIGNIFICANT CHANGE UP (ref 43–77)
PLATELET # BLD AUTO: 329 K/UL — SIGNIFICANT CHANGE UP (ref 150–400)
RBC # BLD: 4.1 M/UL — SIGNIFICANT CHANGE UP (ref 3.8–5.2)
RBC # FLD: 11.5 % — SIGNIFICANT CHANGE UP (ref 10.3–14.5)
WBC # BLD: 7.4 K/UL — SIGNIFICANT CHANGE UP (ref 3.8–10.5)
WBC # FLD AUTO: 7.4 K/UL — SIGNIFICANT CHANGE UP (ref 3.8–10.5)

## 2019-09-26 PROCEDURE — 86870 RBC ANTIBODY IDENTIFICATION: CPT

## 2019-09-26 PROCEDURE — 80053 COMPREHEN METABOLIC PANEL: CPT

## 2019-09-26 PROCEDURE — 87186 SC STD MICRODIL/AGAR DIL: CPT

## 2019-09-26 PROCEDURE — 81001 URINALYSIS AUTO W/SCOPE: CPT

## 2019-09-26 PROCEDURE — 87040 BLOOD CULTURE FOR BACTERIA: CPT

## 2019-09-26 PROCEDURE — 87086 URINE CULTURE/COLONY COUNT: CPT

## 2019-09-26 PROCEDURE — 82435 ASSAY OF BLOOD CHLORIDE: CPT

## 2019-09-26 PROCEDURE — 83605 ASSAY OF LACTIC ACID: CPT

## 2019-09-26 PROCEDURE — 82330 ASSAY OF CALCIUM: CPT

## 2019-09-26 PROCEDURE — 82803 BLOOD GASES ANY COMBINATION: CPT

## 2019-09-26 PROCEDURE — 85730 THROMBOPLASTIN TIME PARTIAL: CPT

## 2019-09-26 PROCEDURE — 84295 ASSAY OF SERUM SODIUM: CPT

## 2019-09-26 PROCEDURE — 99285 EMERGENCY DEPT VISIT HI MDM: CPT

## 2019-09-26 PROCEDURE — 87205 SMEAR GRAM STAIN: CPT

## 2019-09-26 PROCEDURE — 99231 SBSQ HOSP IP/OBS SF/LOW 25: CPT

## 2019-09-26 PROCEDURE — 74177 CT ABD & PELVIS W/CONTRAST: CPT

## 2019-09-26 PROCEDURE — 85027 COMPLETE CBC AUTOMATED: CPT

## 2019-09-26 PROCEDURE — 85014 HEMATOCRIT: CPT

## 2019-09-26 PROCEDURE — 86900 BLOOD TYPING SEROLOGIC ABO: CPT

## 2019-09-26 PROCEDURE — 86901 BLOOD TYPING SEROLOGIC RH(D): CPT

## 2019-09-26 PROCEDURE — 85610 PROTHROMBIN TIME: CPT

## 2019-09-26 PROCEDURE — 99232 SBSQ HOSP IP/OBS MODERATE 35: CPT

## 2019-09-26 PROCEDURE — 87070 CULTURE OTHR SPECIMN AEROBIC: CPT

## 2019-09-26 PROCEDURE — 82947 ASSAY GLUCOSE BLOOD QUANT: CPT

## 2019-09-26 PROCEDURE — 86850 RBC ANTIBODY SCREEN: CPT

## 2019-09-26 PROCEDURE — 84132 ASSAY OF SERUM POTASSIUM: CPT

## 2019-09-26 PROCEDURE — 87075 CULTR BACTERIA EXCEPT BLOOD: CPT

## 2019-09-26 RX ORDER — CEPHALEXIN 500 MG
1 CAPSULE ORAL
Qty: 40 | Refills: 0
Start: 2019-09-26 | End: 2019-10-05

## 2019-09-26 RX ADMIN — Medication 500 MILLIGRAM(S): at 00:06

## 2019-09-26 RX ADMIN — Medication 500 MILLIGRAM(S): at 05:36

## 2019-09-26 RX ADMIN — Medication 500 MILLIGRAM(S): at 11:52

## 2019-09-26 NOTE — DISCHARGE NOTE OB - PLAN OF CARE
resolution continue with Keflex 500mg po q 6hours x 10 days as per ID  F/U with surgery   F/U with me

## 2019-09-26 NOTE — DISCHARGE NOTE OB - PATIENT PORTAL LINK FT
You can access the FollowMyHealth Patient Portal offered by [facility] by registering at the following website: [email or web address]. By joining [email or web address]’s FollowMyHealth portal, you will also be able to view your health information using other applications (apps) compatible with our system.

## 2019-09-26 NOTE — PROGRESS NOTE ADULT - SUBJECTIVE AND OBJECTIVE BOX
R3 OB Note   HD#4, POD#32     S: 30yo  POD#32, HD#4 s/p RLTCS on  who presented with wound infection with outpatient culture positive for MSSA. Yesterday, patient had bedside I&D by surgery team.     Patient seen and examined at bedside, no acute overnight events. No acute complaints, pain well controlled. Denies any HA, blurry vision, lightheadedness, CP/SOB, N/V. Patient is ambulating, voiding spontaneously, passing flatus, and tolerating regular diet.     Vital Signs Last 24 Hours  T(C): 36.8 (19 @ 01:14), Max: 37.1 (19 @ 16:54)  HR: 71 (19 @ 01:14) (55 - 71)  BP: 107/67 (19 @ 01:14) (101/64 - 120/87)  RR: 18 (19 @ 01:14) (18 - 18)  SpO2: 99% (19 @ 01:14) (96% - 100%)    Physical Exam:  General: NAD  CV: RRR  Pulm: CTAB  Abdomen: Soft, non-tender, non-distended  Incision: Pfannenstiel incision with 1-2cm area of erythema and induration on left aspect of incision, open w/o packing in place; tender on right aspect of incision   Pelvic: Lochia wnl; fundus firm and non-tender   Extremities: no calf tenderness noted on exam    Labs:    Blood Type: A Negative  Antibody Screen: Positive               12.4   6.8   )-----------( 296      (  @ 06:47 )             38.0                11.1   7.0   )-----------( 287      (  @ 07:27 )             34.2                12.7   7.4   )-----------( 333      (  @ 17:49 )             38.5         MEDICATIONS  (STANDING):  cephalexin 500 milliGRAM(s) Oral four times a day  heparin  Injectable 5000 Unit(s) SubCutaneous every 12 hours  lactated ringers. 1000 milliLiter(s) (125 mL/Hr) IV Continuous <Continuous>    MEDICATIONS  (PRN):  acetaminophen   Tablet .. 975 milliGRAM(s) Oral every 6 hours PRN Moderate Pain (4 - 6)  ibuprofen  Tablet. 600 milliGRAM(s) Oral every 6 hours PRN Moderate Pain (4 - 6) R3 OB Note   HD#4, POD#32     S: 30yo  POD#32, HD#4 s/p RLTCS on  who presented with wound infection with outpatient culture positive for MSSA. Yesterday, patient had bedside I&D by surgery team.     Patient seen and examined at bedside, no acute overnight events. No acute complaints, pain well controlled. Denies any HA, blurry vision, lightheadedness, CP/SOB, N/V. Patient is ambulating, voiding spontaneously, passing flatus, and tolerating regular diet.     Vital Signs Last 24 Hours  T(C): 36.8 (19 @ 01:14), Max: 37.1 (19 @ 16:54)  HR: 71 (19 @ :14) (55 - 71)  BP: 107/67 (19 @ 01:14) (101/64 - 120/87)  RR: 18 (19 @ :14) (18 - 18)  SpO2: 99% (19 @ 01:14) (96% - 100%)    Physical Exam:  General: NAD  CV: RRR  Pulm: CTAB  Abdomen: Soft, non-tender, non-distended  Incision: Pfannenstiel incision with 1-2cm area of erythema and induration on left aspect of incision and superior aspect of right side of incision, <0.5cm opening w/ minimal drainage; minimal tenderness on right aspect of incision   Pelvic: Lochia wnl; fundus firm and non-tender   Extremities: no calf tenderness noted on exam    Labs:    Blood Type: A Negative  Antibody Screen: Positive               12.4   6.8   )-----------( 296      (  @ 06:47 )             38.0                11.1   7.0   )-----------( 287      (  @ 07:27 )             34.2                12.7   7.4   )-----------( 333      (  @ 17:49 )             38.5         MEDICATIONS  (STANDING):  cephalexin 500 milliGRAM(s) Oral four times a day  heparin  Injectable 5000 Unit(s) SubCutaneous every 12 hours  lactated ringers. 1000 milliLiter(s) (125 mL/Hr) IV Continuous <Continuous>    MEDICATIONS  (PRN):  acetaminophen   Tablet .. 975 milliGRAM(s) Oral every 6 hours PRN Moderate Pain (4 - 6)  ibuprofen  Tablet. 600 milliGRAM(s) Oral every 6 hours PRN Moderate Pain (4 - 6)

## 2019-09-26 NOTE — PROGRESS NOTE ADULT - REASON FOR ADMISSION
wound infection

## 2019-09-26 NOTE — DISCHARGE NOTE OB - CARE PLAN
Principal Discharge DX:	Surgical site infection  Goal:	resolution  Assessment and plan of treatment:	continue with Keflex 500mg po q 6hours x 10 days as per ID  F/U with surgery   F/U with me

## 2019-09-26 NOTE — PROGRESS NOTE ADULT - SUBJECTIVE AND OBJECTIVE BOX
29y old  Female who presents with a chief complaint of wound infection (26 Sep 2019 11:50)      Interval history:  Afebrile, had attempted drainage by surgery today. Feels better.       No Known Allergies      Antimicrobials:  cephalexin 500 milliGRAM(s) Oral four times a day      REVIEW OF SYSTEMS:  No chest pain or palpitations  No cough, no SOB  No N/V/D, no abdominal pain  No dysuria or frequency  No rash.       Vital Signs Last 24 Hrs  T(C): 36.6 (09-26-19 @ 08:49), Max: 37.1 (09-25-19 @ 16:54)  T(F): 97.9 (09-26-19 @ 08:49), Max: 98.8 (09-25-19 @ 16:54)  HR: 54 (09-26-19 @ 08:49) (54 - 71)  BP: 107/72 (09-26-19 @ 08:49) (107/67 - 119/82)  BP(mean): --  RR: 18 (09-26-19 @ 08:49) (18 - 18)  SpO2: 99% (09-26-19 @ 08:49) (99% - 100%)      PHYSICAL EXAM:  Patient in no acute distress. AAOX3.  Cardiovascular: S1S2 normal.  Lungs: Good air entry B/L lung fields.  Gastrointestinal: soft, Right aspect of lower abdominal incision with minimal erythema and induration, small opening with blood tinged yellow fluid expressed. Left aspect of incision appears non-erythematous, non-indurated, small opening noted with minimal drainage.   Extremities: no edema.  IV sites not inflamed.                           12.6   7.4   )-----------( 329      ( 26 Sep 2019 07:30 )             37.6         Culture - Body Fluid with Gram Stain (collected 25 Sep 2019 18:56)  Source: .Body Fluid  Preliminary Report (26 Sep 2019 16:03):    Few Staphylococcus aureus    Culture - Urine (collected 24 Sep 2019 17:22)  Source: .Urine  Final Report (25 Sep 2019 14:52):    No growth    Culture - Blood (collected 23 Sep 2019 22:23)  Source: .Blood  Preliminary Report (24 Sep 2019 23:01):    No growth to date.    Culture - Blood (collected 23 Sep 2019 21:32)  Source: .Blood  Preliminary Report (24 Sep 2019 22:01):    No growth to date.

## 2019-09-26 NOTE — DISCHARGE NOTE OB - HOSPITAL COURSE
28 y/o W/ F  s/p RCS 32 days ago. Pt was admitted for wound infection with MSSA. She has had ID and General surgery consultation. She has been treated with antibiotics and condition has improved. She will be d/c with keflex 500 mg q 5h for 10 days.  She will f/u with me and in the surgery clinic in the next few days.

## 2019-09-26 NOTE — PROGRESS NOTE ADULT - SUBJECTIVE AND OBJECTIVE BOX
Surgery Progress Note     Subjective/24hour Events:   Patient seen and examined.   Yesterday for beside I&D, tolerated.   No acute events overnight.   Pain controlled.     Vital Signs:  Vital Signs Last 24 Hrs  T(C): 36.8 (26 Sep 2019 01:14), Max: 37.1 (25 Sep 2019 16:54)  T(F): 98.2 (26 Sep 2019 01:14), Max: 98.8 (25 Sep 2019 16:54)  HR: 71 (26 Sep 2019 01:14) (55 - 71)  BP: 107/67 (26 Sep 2019 01:14) (101/64 - 120/87)  BP(mean): --  RR: 18 (26 Sep 2019 01:14) (18 - 18)  SpO2: 99% (26 Sep 2019 01:14) (96% - 100%)    CAPILLARY BLOOD GLUCOSE          I&O's Detail      MEDICATIONS  (STANDING):  cephalexin 500 milliGRAM(s) Oral four times a day  heparin  Injectable 5000 Unit(s) SubCutaneous every 12 hours  lactated ringers. 1000 milliLiter(s) (125 mL/Hr) IV Continuous <Continuous>    MEDICATIONS  (PRN):  acetaminophen   Tablet .. 975 milliGRAM(s) Oral every 6 hours PRN Moderate Pain (4 - 6)  ibuprofen  Tablet. 600 milliGRAM(s) Oral every 6 hours PRN Moderate Pain (4 - 6)      Physical Exam:  Gen: NAD.  Lungs: Non labored breathing.   Abdomen: soft, ND, incision clean, no active drainage noted from wound.    Ext: Moves all 4 spontaneously.     Labs:                                  12.4   6.8   )-----------( 296      ( 25 Sep 2019 06:47 )             38.0

## 2019-09-26 NOTE — PROGRESS NOTE ADULT - ATTENDING COMMENTS
Giana Jaimes  Pager: [phone]. If no response or past 5 pm call [phone].
Giana Jaimes  Pager: [phone]. If no response or past 5 pm call [phone].
I have seen and examined the patient. I agree with the above surgery resident's note.  home on abx per ID, f/u in office 1 week
I saw and examined the pt and discussed the tx plan with the House Staff. I agree with the exam and plan as documented in the surgery resident's note from today.  No pain.  Incision with mild erythema at the cephalad aspect.  Picture c/w cellulitis, continue ABX, no indication for opening the wound at this time. D/c home as per OB and ID.   Recommended office f/u with me next week.  Will sign off at this time, pls call us back with questions or concerns.   Dilcia Lay MD
I saw and examined the pt and discussed the tx plan with the House Staff. I agree with the exam and plan as documented in the surgery resident's note from today.  No evidence of necrotizing fascitis. Incision is mildly swollen and has some skin discoloration.   I do not appreciate need for opening the wound and placing a VAC at this time. Continue observation on ABX.  Dilcia Lay MD

## 2019-09-26 NOTE — PROGRESS NOTE ADULT - ASSESSMENT
30yo  POD#32, HD#4 s/p RLTCS on  who presented with wound infection with outpatient culture positive for MSSA. Yesterday, patient had bedside I&D by surgery team.     Neuro: c/w po pain meds  CV: Hemodynamically stable, AM CBC w/ diff pending   Pulm: Saturating well on room air, encourage incentive spirometry  GI: c/w regular diet  ID: Continue on Ancef, now s/p zosyn. Outpatient wound cx w/ MSSA. Appreciate ID consult. Appreciate surgery consult. Blood and urine cultures () pending. Wound cx obtained by surgery , pending.   : voiding spontaneously  Heme: c/w HSQ and SCDs for DVT ppx  Dispo: Continue routine care with IV antibiotics     CARROLL Olguin, PGY-3 28yo  POD#32, HD#4 s/p RLTCS on  who presented with wound infection with outpatient culture positive for MSSA. Yesterday, patient had bedside I&D by surgery team.     Neuro: c/w po pain meds  CV: Hemodynamically stable, AM CBC w/ diff pending   Pulm: Saturating well on room air, encourage incentive spirometry  GI: c/w regular diet  ID: Continue on keflex. s/p Ancef and zosyn. Outpatient wound cx w/ MSSA. Appreciate ID consult. Appreciate surgery consult. Blood and urine cultures () pending. Wound cx obtained by surgery , pending.   : voiding spontaneously  Heme: c/w HSQ and SCDs for DVT ppx  Dispo: Continue routine care, transitioned to po antibiotics overnight.     CARROLL Olguin, PGY-3

## 2019-09-26 NOTE — PROGRESS NOTE ADULT - ASSESSMENT
30 yo  s/p scheduled rLTCS on  presented to ED for worsening right sided abdominal pain over incision site. She originally presented to ED on  for fevers/chills, presumed to have endometritis, s/p vanc/Zosyn, discharged on azithromycin. She was followed up by Dr. Hernadez in clinic who obtained wound cultures from the left side of the wound. Cultures growing MSSA. She returned to ED for sharp, "razor-blade" pain over right aspect of incision site, with the right side wound appearing erythematous and indurated on exam today consistent with cellulitis. CT A/P with contrast showing no fluid collections, no sign of acute appendicitis. Currently on Zosyn (-).     Overall cellulitis, MSSA infection, recent surgery.    clinically improving.       RECOMMENDATIONS:  - continue with cefazolin 1 gm iv q8h while inpt    - can transition to PO cephalexin 500 mg 4 times a day on discharge.   - will plan for 10 day therapy total until 10/3/19.   - follow up abscess culture.

## 2019-09-26 NOTE — DISCHARGE NOTE OB - CARE PROVIDER_API CALL
Tiffanie Hernadez)  Obstetrics and Gynecology  [address] [place] [address]  Phone: [phone]  Fax: [phone]  Follow Up Time: 1 week

## 2019-09-26 NOTE — PROGRESS NOTE ADULT - PROBLEM SELECTOR PLAN 1
much improved and pain greatly deminished  D/C home on Keflex and per ID recommendation  F/U next week

## 2019-09-26 NOTE — PROGRESS NOTE ADULT - ASSESSMENT
30 y/o female s/p  with continued erythema and drainage at previous  site without leukocytosis, CT scan demonstrating post-surgical changes with no collection, low concern for nec. fasc. based on clinical exam.     - Continue to monitor abdominal wound on antibiotics.   - F/u cultures from .  - Appreciate [facility] per OB/GYN.      [facility] Pager #[phone]

## 2019-09-26 NOTE — PROVIDER CONTACT NOTE (OTHER) - BACKGROUND
Postpartum readmit for wound infection, s/p c/s on 8/25/19. Patient educated on risks of refusing heparin and PAS.  Patient verbalized understanding.  Patient ambulating in room.

## 2019-09-26 NOTE — DISCHARGE NOTE OB - MEDICATION SUMMARY - MEDICATIONS TO STOP TAKING
I will STOP taking the medications listed below when I get home from the hospital:    Azithromycin 3 Day Dose Pack 500 mg oral tablet  -- 1 tab(s) by mouth once a day   -- Do not take dairy products, antacids, or iron preparations within one hour of this medication.  Finish all this medication unless otherwise directed by prescriber.

## 2019-09-26 NOTE — PROGRESS NOTE ADULT - SUBJECTIVE AND OBJECTIVE BOX
Section/Postpartum with postop wound infection with MSSA    MARY RUTH is a  29y woman   , who is now post-operative day 32    PAST MEDICAL & SURGICAL HISTORY:  No pertinent past medical history  H/O  section      Subjective:  The patient feels well.  She is ambulating without difficulty.  She is tolerating PO.  She is voiding.  She denies nausea and vomiting.  Her pain is controlled.  She reports normal postpartum bleeding  She is breastfeeding.  She is formula feeding.    Physical exam:    Vital Signs Last 24 Hrs  T(C): 36.6 (26 Sep 2019 08:49), Max: 37.1 (25 Sep 2019 16:54)  T(F): 97.9 (26 Sep 2019 08:49), Max: 98.8 (25 Sep 2019 16:54)  HR: 54 (26 Sep 2019 08:49) (54 - 71)  BP: 107/72 (26 Sep 2019 08:49) (107/67 - 120/87)  BP(mean): --  RR: 18 (26 Sep 2019 08:49) (18 - 18)  SpO2: 99% (26 Sep 2019 08:49) (99% - 100%)    General Apperance: alert and oriented in no acute distress  Breast: Soft, nontender, non engorged  Abdomen: Soft, nontender, not distended,  Uterine fundus is firm and below the umbilicus, BS(+), Flatus(+), Bowel Movement (+)  Incision: mild cellulitis and erythema along the incision line but improved from admission. nontender   Pelvic: no vaginal bleeding  Ext: No calf tenderness, No edema or cyanosis      LABS:                        12.6   7.4   )-----------( 329      ( 26 Sep 2019 07:30 )             37.6     Allergies    No Known Allergies    Intolerances        MEDICATIONS  (STANDING):  cephalexin 500 milliGRAM(s) Oral four times a day  heparin  Injectable 5000 Unit(s) SubCutaneous every 12 hours  lactated ringers. 1000 milliLiter(s) (125 mL/Hr) IV Continuous <Continuous>    MEDICATIONS  (PRN):  acetaminophen   Tablet .. 975 milliGRAM(s) Oral every 6 hours PRN Moderate Pain (4 - 6)  ibuprofen  Tablet. 600 milliGRAM(s) Oral every 6 hours PRN Moderate Pain (4 - 6)        Assessment and Plan:    POD # 32  Incision and drainage, wound, abdomen      Doing well. Appreciate surgical and ID consults and recomendations. Pt will follow up with surgery to review culture results taken on 19.  Encouraged to ambulate.  Drink plenty of water and fluids.  Discharge home today.  Paient to take ibuprofen 600mg every 6 hours and/or Tylenol 2 (325mg tablet) tablets every 6 hoursfor pain  Keflex 500 mg po q 6 Hours  Follow up in 1 for incision check, MARY RUTH is to call the office for an appointment.  To use abdominal binder while awake as needed  Verbal discharge instructions d/w patient  - discharge summary to be given to her on discharge for the hospital.  Call for fevers, chills, nausea, vomiting, heavy vaginal bleeding, vaginal discharge, severe pain, symptoms of depression, problems with incision or any other concerning symptoms.  Nothing in vagina and no heavy lifting for  6-8 weeks. No sex!  Patient to continue with prenatal vitamins.

## 2019-09-26 NOTE — DISCHARGE NOTE OB - MEDICATION SUMMARY - MEDICATIONS TO TAKE
I will START or STAY ON the medications listed below when I get home from the hospital:    acetaminophen 325 mg oral tablet  -- 3 tab(s) by mouth   -- Indication: For pain    ibuprofen 200 mg oral tablet  -- 3 tab(s) by mouth every 6 hours  -- Indication: For pain    magnesium hydroxide 8% oral suspension  -- 30 milliliter(s) by mouth 2 times a day, As needed, Constipation  -- Indication: For constipation    cephalexin 500 mg oral capsule  -- 1 cap(s) by mouth 4 times a day  -- Indication: For antibiotics    Prenatal Multivitamins with Folic Acid 1 mg oral tablet  -- 1 tab(s) by mouth once a day  -- Indication: For vitamin    docusate sodium 100 mg oral capsule  -- 1 cap(s) by mouth 2 times a day, As needed, Stool softening  -- Indication: For Stool softner

## 2019-09-26 NOTE — DISCHARGE NOTE OB - ADDITIONAL INSTRUCTIONS
Please follow up with your OB/GYN to schedule and attend your postpartum visit.  Call the doctor immediately for increased vaginal bleeding, temperature greater than 100 degrees Farenheit, headache, right upper quadrant pain,  or blurry vision.   No sex, no tampons, douching or anything placed into vagina.

## 2019-09-26 NOTE — PROVIDER CONTACT NOTE (OTHER) - ASSESSMENT
Patient resting comfortably, no signs of distress. Patient fully ambulatory in room and performing active ROM of all extremities.

## 2019-09-26 NOTE — PROGRESS NOTE ADULT - SUBJECTIVE AND OBJECTIVE BOX
Trauma Surgery Progress Note    SUBJECTIVE/ROS: Patient examined at bed side. No acute events overnight.   Denies nausea, vomiting, chest pain, shortness of breath    24hr Events as per SICU     Medications:  cephalexin 500  cephalexin 500  heparin  Injectable 5000      Objective:  Vital Signs Last 24 Hrs  T(C): 36.8 (26 Sep 2019 01:14), Max: 37.1 (25 Sep 2019 16:54)  T(F): 98.2 (26 Sep 2019 01:14), Max: 98.8 (25 Sep 2019 16:54)  HR: 71 (26 Sep 2019 01:14) (55 - 71)  BP: 107/67 (26 Sep 2019 01:14) (101/64 - 120/87)  BP(mean): --  RR: 18 (26 Sep 2019 01:14) (18 - 18)  SpO2: 99% (26 Sep 2019 01:14) (96% - 100%)    Physical Exam:  Gen: NAD, resting comfortably in bed  Resp: No increased WOB   Abd: nontender, nondistended, soft, compressible  Extr: WWP distally, cap refill <2s    I&O's Summary      LABS:                        12.4   6.8   )-----------( 296      ( 25 Sep 2019 06:47 )             38.0             Urinalysis Basic - ( 24 Sep 2019 07:27 )    Color: Light Yellow / Appearance: Clear / SG: >1.050 / pH: x  Gluc: x / Ketone: Negative  / Bili: Negative / Urobili: Negative   Blood: x / Protein: Trace / Nitrite: Negative   Leuk Esterase: Negative / RBC: 46 /hpf / WBC 3 /HPF   Sq Epi: x / Non Sq Epi: 1 /hpf / Bacteria: Negative      cephalexin 500    cephalexin 500  heparin  Injectable 5000      RADIOLOGY, EKG & ADDITIONAL TESTS: Reviewed.

## 2019-09-27 LAB
-  AMPICILLIN/SULBACTAM: SIGNIFICANT CHANGE UP
-  CEFAZOLIN: SIGNIFICANT CHANGE UP
-  CLINDAMYCIN: SIGNIFICANT CHANGE UP
-  ERYTHROMYCIN: SIGNIFICANT CHANGE UP
-  GENTAMICIN: SIGNIFICANT CHANGE UP
-  OXACILLIN: SIGNIFICANT CHANGE UP
-  PENICILLIN: SIGNIFICANT CHANGE UP
-  RIFAMPIN: SIGNIFICANT CHANGE UP
-  TETRACYCLINE: SIGNIFICANT CHANGE UP
-  TRIMETHOPRIM/SULFAMETHOXAZOLE: SIGNIFICANT CHANGE UP
-  VANCOMYCIN: SIGNIFICANT CHANGE UP
METHOD TYPE: SIGNIFICANT CHANGE UP

## 2019-09-28 LAB
CULTURE RESULTS: SIGNIFICANT CHANGE UP
CULTURE RESULTS: SIGNIFICANT CHANGE UP
SPECIMEN SOURCE: SIGNIFICANT CHANGE UP
SPECIMEN SOURCE: SIGNIFICANT CHANGE UP

## 2019-10-05 LAB
CULTURE RESULTS: SIGNIFICANT CHANGE UP
ORGANISM # SPEC MICROSCOPIC CNT: SIGNIFICANT CHANGE UP
ORGANISM # SPEC MICROSCOPIC CNT: SIGNIFICANT CHANGE UP
SPECIMEN SOURCE: SIGNIFICANT CHANGE UP

## 2020-04-25 ENCOUNTER — MESSAGE (OUTPATIENT)
Age: 30
End: 2020-04-25

## 2020-05-07 LAB
SARS-COV-2 IGG SERPL IA-ACNC: <0.1 INDEX
SARS-COV-2 IGG SERPL QL IA: NEGATIVE

## 2021-06-02 ENCOUNTER — EMERGENCY (EMERGENCY)
Facility: HOSPITAL | Age: 31
LOS: 1 days | Discharge: HOME | End: 2021-06-02
Payer: COMMERCIAL

## 2021-06-02 VITALS
RESPIRATION RATE: 16 BRPM | OXYGEN SATURATION: 98 % | SYSTOLIC BLOOD PRESSURE: 115 MMHG | TEMPERATURE: 98 F | HEIGHT: 63 IN | HEART RATE: 86 BPM | DIASTOLIC BLOOD PRESSURE: 71 MMHG | WEIGHT: 149.91 LBS

## 2021-06-02 DIAGNOSIS — Z98.89 OTHER SPECIFIED POSTPROCEDURAL STATES: Chronic | ICD-10-CM

## 2021-06-02 PROCEDURE — 99283 EMERGENCY DEPT VISIT LOW MDM: CPT

## 2021-06-02 PROCEDURE — U0003: CPT

## 2021-06-02 PROCEDURE — U0005: CPT

## 2021-06-02 NOTE — ED PROVIDER NOTE - OBJECTIVE STATEMENT
32 y/o F presenting for covid testing. Pt had no exposure. Pt feeling well, no symptoms. Denies fever, chills, cough, sob, cp, body aches, loss of smell/taste.

## 2021-06-02 NOTE — ED PROVIDER NOTE - PATIENT PORTAL LINK FT
You can access the FollowMyHealth Patient Portal offered by [facility] by registering at the following website: [email or web address]. By joining [employer]’s FollowMyHealth portal, you will also be able to view your health information using other applications (apps) compatible with our system.

## 2021-06-03 LAB — SARS-COV-2 RNA SPEC QL NAA+PROBE: SIGNIFICANT CHANGE UP

## 2021-12-13 ENCOUNTER — RESULT REVIEW (OUTPATIENT)
Age: 31
End: 2021-12-13

## 2021-12-30 ENCOUNTER — EMERGENCY (EMERGENCY)
Facility: HOSPITAL | Age: 31
LOS: 1 days | Discharge: HOME | End: 2021-12-30
Attending: EMERGENCY MEDICINE
Payer: COMMERCIAL

## 2021-12-30 VITALS
RESPIRATION RATE: 18 BRPM | HEART RATE: 93 BPM | TEMPERATURE: 98 F | SYSTOLIC BLOOD PRESSURE: 108 MMHG | HEIGHT: 63 IN | OXYGEN SATURATION: 98 % | DIASTOLIC BLOOD PRESSURE: 73 MMHG

## 2021-12-30 DIAGNOSIS — Z98.89 OTHER SPECIFIED POSTPROCEDURAL STATES: Chronic | ICD-10-CM

## 2021-12-30 PROCEDURE — U0005: CPT

## 2021-12-30 PROCEDURE — 99283 EMERGENCY DEPT VISIT LOW MDM: CPT

## 2021-12-30 PROCEDURE — U0003: CPT

## 2021-12-30 PROCEDURE — 99282 EMERGENCY DEPT VISIT SF MDM: CPT

## 2021-12-30 NOTE — ED PROVIDER NOTE - OBJECTIVE STATEMENT
Healthy 30 yo female with known covid exposure requesting swab, asynptomatic; +vaccinated and previously had covid

## 2021-12-30 NOTE — ED PROVIDER NOTE - PATIENT PORTAL LINK FT
You can access the FollowMyHealth Patient Portal offered by [facility] by registering at the following website: [email or web address]. By joining [employer]’s FollowMyHealth portal, you will also be able to view your health information using other applications (apps) compatible with our system.

## 2021-12-31 LAB — SARS-COV-2 RNA SPEC QL NAA+PROBE: SIGNIFICANT CHANGE UP

## 2022-05-26 NOTE — DISCHARGE NOTE OB - PROCEDURE 1
10/10 neck and mid back pain. Saw Dr. Milian in orthopedics yesterday but pain is unbearable today. Pt tearful in triage, states she took two muscle relaxers last night with no relief. Endorses some numbness to the LUE but states it just hurts so bad she doesn't know. Hx of lumbar fusion in 2018. Hypertensive in triage.       
Repeat  delivery with postpartum care

## 2022-05-31 ENCOUNTER — APPOINTMENT (OUTPATIENT)
Dept: OTOLARYNGOLOGY | Facility: CLINIC | Age: 32
End: 2022-05-31

## 2022-05-31 VITALS — BODY MASS INDEX: 29.08 KG/M2 | HEIGHT: 62 IN | WEIGHT: 158 LBS

## 2022-05-31 DIAGNOSIS — J38.4 EDEMA OF LARYNX: ICD-10-CM

## 2022-05-31 DIAGNOSIS — K21.9 GASTRO-ESOPHAGEAL REFLUX DISEASE W/OUT ESOPHAGITIS: ICD-10-CM

## 2022-05-31 PROCEDURE — 31575 DIAGNOSTIC LARYNGOSCOPY: CPT

## 2022-05-31 PROCEDURE — 99203 OFFICE O/P NEW LOW 30 MIN: CPT | Mod: 25

## 2022-05-31 RX ORDER — FLUOCINONIDE 0.5 MG/ML
0.05 SOLUTION TOPICAL
Qty: 60 | Refills: 0 | Status: COMPLETED | COMMUNITY
Start: 2022-03-17

## 2022-05-31 RX ORDER — CLOBETASOL PROPIONATE 0.5 MG/G
0.05 CREAM TOPICAL
Qty: 60 | Refills: 0 | Status: COMPLETED | COMMUNITY
Start: 2022-03-17

## 2022-05-31 RX ORDER — HYDROCORTISONE 25 MG/G
2.5 CREAM TOPICAL
Qty: 30 | Refills: 0 | Status: COMPLETED | COMMUNITY
Start: 2022-03-17

## 2022-05-31 RX ORDER — ASCORBIC ACID 500 MG
TABLET ORAL
Refills: 0 | Status: ACTIVE | COMMUNITY

## 2022-05-31 RX ORDER — OMEPRAZOLE 40 MG/1
40 CAPSULE, DELAYED RELEASE ORAL
Qty: 90 | Refills: 0 | Status: ACTIVE | COMMUNITY
Start: 2022-05-31 | End: 1900-01-01

## 2022-05-31 NOTE — CONSULT LETTER
[Dear  ___] : Dear  [unfilled], [Consult Letter:] : I had the pleasure of evaluating your patient, [unfilled]. [Please see my note below.] : Please see my note below. [Consult Closing:] : Thank you very much for allowing me to participate in the care of this patient.  If you have any questions, please do not hesitate to contact me. [Sincerely,] : Sincerely, [FreeTextEntry2] : Dr. Scott Sanchez [FreeTextEntry3] : Miranda Zepeda MD\par Facial Plastic & Reconstructive Surgery\par Department of Otolaryngology\par [address]\par [place], [place]\par [phone]\par \par [address]\[place] [address]\par [phone]

## 2022-05-31 NOTE — ASSESSMENT
[FreeTextEntry1] : 32 year old female with globus sensation likely related to mild to moderate LPR. Discussed dietary changes and avoiding triggers. She will also start omperazole 40 mg daily.

## 2022-05-31 NOTE — PROCEDURE
[FreeTextEntry1] : Flexible fiberoptic laryngoscopy [FreeTextEntry2] : Globus sensation [FreeTextEntry3] : Procedure: Flexible fiberoptic laryngoscopy\par \par Pre-operative diagnosis: \par \par Indication: unable to tolerate mirror exam\par \par Details:\par After decongestant and lidocaine was sprayed in the bilateral nasal cavities, a flexible laryngoscope was inserted into the right nares. The nasal cavity, middle meatus, nasopharynx, and glottis were visualized. The endoscope was then inserted into the left nares and the nasal cavity was visualized. The patient tolerated procedure well.\par \par Results:\par Right nasal cavity: clear without masses or lesions, clear secretions\par Right inferior turbinate: normal\par Right middle turbinate: normal\par Right middle meatus: normal without masses, pus\par Right ETO: normal\par Left nasal cavity: clear without masses or lesions, clear secretions\par Left inferior turbinate: normal\par Left middle turbinate: normal\par Left middle meatus: normal without masses, pus\par Left ETO: normal\par Nasopharynx: normal without masses or lesions\par Base of tongue: clear\par Vallecula: Clear\par Secretions: normal\par Glottis: Vocal cords mobile and symmetric, piriform sinus clear, normal AE folds, arytenoids\par Normal appearing subglottis.\par posterior pharyngeal cobblestoning, mild post cricoid edema\par \par Findings:\par mild LPR

## 2022-05-31 NOTE — HISTORY OF PRESENT ILLNESS
[de-identified] : 32 year old female presents for right throat discomfort for about 5-6 years. Reports intermittent dry throat, takes cough drops with improvement. Reports intermittent dysphagia with solids and liquids. No sensation of food getting stuck. She feels like she takes longer to chew. Denies odynophagia, aspirations, dyspnea, dysphonia. Reports occasionally consumes wine.  Denies history of smoking. During pregnancy, she had acid reflux or heartburn - 2 and 3 years ago. She gets symptoms here and there for acid reflux. For breakfast she has coffee and eggs. lunch she skips but sometimes has chicken nuggets or pizza. For dinner she has veggies and meat. She has dinner at 6-6:30 pm. She sleeps at 10 pm. She has almost 6 cups of coffee a day. She does eat spicy foods.

## 2022-11-25 ENCOUNTER — EMERGENCY (EMERGENCY)
Facility: HOSPITAL | Age: 32
LOS: 1 days | Discharge: HOME | End: 2022-11-25
Attending: EMERGENCY MEDICINE | Admitting: EMERGENCY MEDICINE
Payer: COMMERCIAL

## 2022-11-25 VITALS
RESPIRATION RATE: 16 BRPM | OXYGEN SATURATION: 99 % | DIASTOLIC BLOOD PRESSURE: 74 MMHG | HEART RATE: 95 BPM | TEMPERATURE: 98 F | SYSTOLIC BLOOD PRESSURE: 125 MMHG

## 2022-11-25 VITALS
RESPIRATION RATE: 16 BRPM | TEMPERATURE: 100 F | OXYGEN SATURATION: 98 % | HEART RATE: 88 BPM | DIASTOLIC BLOOD PRESSURE: 74 MMHG | SYSTOLIC BLOOD PRESSURE: 134 MMHG | WEIGHT: 149.91 LBS

## 2022-11-25 DIAGNOSIS — Z98.89 OTHER SPECIFIED POSTPROCEDURAL STATES: Chronic | ICD-10-CM

## 2022-11-25 LAB
ALBUMIN SERPL ELPH-MCNC: 3.8 G/DL — SIGNIFICANT CHANGE UP (ref 3.3–5)
ALP SERPL-CCNC: 65 U/L — SIGNIFICANT CHANGE UP (ref 40–120)
ALT FLD-CCNC: 25 U/L — SIGNIFICANT CHANGE UP (ref 12–78)
ANION GAP SERPL CALC-SCNC: 7 MMOL/L — SIGNIFICANT CHANGE UP (ref 5–17)
AST SERPL-CCNC: 20 U/L — SIGNIFICANT CHANGE UP (ref 15–37)
BASOPHILS # BLD AUTO: 0.03 K/UL — SIGNIFICANT CHANGE UP (ref 0–0.2)
BASOPHILS NFR BLD AUTO: 0.4 % — SIGNIFICANT CHANGE UP (ref 0–2)
BILIRUB SERPL-MCNC: 0.5 MG/DL — SIGNIFICANT CHANGE UP (ref 0.2–1.2)
BUN SERPL-MCNC: 10 MG/DL — SIGNIFICANT CHANGE UP (ref 7–23)
CALCIUM SERPL-MCNC: 8.8 MG/DL — SIGNIFICANT CHANGE UP (ref 8.5–10.1)
CHLORIDE SERPL-SCNC: 108 MMOL/L — SIGNIFICANT CHANGE UP (ref 96–108)
CO2 SERPL-SCNC: 25 MMOL/L — SIGNIFICANT CHANGE UP (ref 22–31)
CREAT SERPL-MCNC: 0.62 MG/DL — SIGNIFICANT CHANGE UP (ref 0.5–1.3)
EGFR: 120 ML/MIN/1.73M2 — SIGNIFICANT CHANGE UP
EOSINOPHIL # BLD AUTO: 0.02 K/UL — SIGNIFICANT CHANGE UP (ref 0–0.5)
EOSINOPHIL NFR BLD AUTO: 0.2 % — SIGNIFICANT CHANGE UP (ref 0–6)
FLUAV AG NPH QL: DETECTED
FLUBV AG NPH QL: SIGNIFICANT CHANGE UP
GLUCOSE SERPL-MCNC: 86 MG/DL — SIGNIFICANT CHANGE UP (ref 70–99)
HCG SERPL-ACNC: <1 MIU/ML — SIGNIFICANT CHANGE UP
HCT VFR BLD CALC: 40.8 % — SIGNIFICANT CHANGE UP (ref 34.5–45)
HGB BLD-MCNC: 13.9 G/DL — SIGNIFICANT CHANGE UP (ref 11.5–15.5)
IMM GRANULOCYTES NFR BLD AUTO: 0.5 % — SIGNIFICANT CHANGE UP (ref 0–0.9)
LYMPHOCYTES # BLD AUTO: 0.64 K/UL — LOW (ref 1–3.3)
LYMPHOCYTES # BLD AUTO: 7.6 % — LOW (ref 13–44)
MCHC RBC-ENTMCNC: 30.8 PG — SIGNIFICANT CHANGE UP (ref 27–34)
MCHC RBC-ENTMCNC: 34.1 GM/DL — SIGNIFICANT CHANGE UP (ref 32–36)
MCV RBC AUTO: 90.5 FL — SIGNIFICANT CHANGE UP (ref 80–100)
MONOCYTES # BLD AUTO: 0.63 K/UL — SIGNIFICANT CHANGE UP (ref 0–0.9)
MONOCYTES NFR BLD AUTO: 7.5 % — SIGNIFICANT CHANGE UP (ref 2–14)
NEUTROPHILS # BLD AUTO: 7.02 K/UL — SIGNIFICANT CHANGE UP (ref 1.8–7.4)
NEUTROPHILS NFR BLD AUTO: 83.8 % — HIGH (ref 43–77)
NRBC # BLD: 0 /100 WBCS — SIGNIFICANT CHANGE UP (ref 0–0)
PLATELET # BLD AUTO: 242 K/UL — SIGNIFICANT CHANGE UP (ref 150–400)
POTASSIUM SERPL-MCNC: 4 MMOL/L — SIGNIFICANT CHANGE UP (ref 3.5–5.3)
POTASSIUM SERPL-SCNC: 4 MMOL/L — SIGNIFICANT CHANGE UP (ref 3.5–5.3)
PROT SERPL-MCNC: 7.8 G/DL — SIGNIFICANT CHANGE UP (ref 6–8.3)
RBC # BLD: 4.51 M/UL — SIGNIFICANT CHANGE UP (ref 3.8–5.2)
RBC # FLD: 13 % — SIGNIFICANT CHANGE UP (ref 10.3–14.5)
RSV RNA NPH QL NAA+NON-PROBE: SIGNIFICANT CHANGE UP
SARS-COV-2 RNA SPEC QL NAA+PROBE: SIGNIFICANT CHANGE UP
SODIUM SERPL-SCNC: 140 MMOL/L — SIGNIFICANT CHANGE UP (ref 135–145)
WBC # BLD: 8.38 K/UL — SIGNIFICANT CHANGE UP (ref 3.8–10.5)
WBC # FLD AUTO: 8.38 K/UL — SIGNIFICANT CHANGE UP (ref 3.8–10.5)

## 2022-11-25 PROCEDURE — 87637 SARSCOV2&INF A&B&RSV AMP PRB: CPT

## 2022-11-25 PROCEDURE — 93005 ELECTROCARDIOGRAM TRACING: CPT

## 2022-11-25 PROCEDURE — 70450 CT HEAD/BRAIN W/O DYE: CPT | Mod: 26,MA

## 2022-11-25 PROCEDURE — 99285 EMERGENCY DEPT VISIT HI MDM: CPT

## 2022-11-25 PROCEDURE — 99285 EMERGENCY DEPT VISIT HI MDM: CPT | Mod: 25

## 2022-11-25 PROCEDURE — 93010 ELECTROCARDIOGRAM REPORT: CPT

## 2022-11-25 PROCEDURE — 70450 CT HEAD/BRAIN W/O DYE: CPT | Mod: MA

## 2022-11-25 PROCEDURE — 36415 COLL VENOUS BLD VENIPUNCTURE: CPT

## 2022-11-25 PROCEDURE — 85025 COMPLETE CBC W/AUTO DIFF WBC: CPT

## 2022-11-25 PROCEDURE — 80053 COMPREHEN METABOLIC PANEL: CPT

## 2022-11-25 PROCEDURE — 84702 CHORIONIC GONADOTROPIN TEST: CPT

## 2022-11-25 RX ORDER — SODIUM CHLORIDE 9 MG/ML
1000 INJECTION INTRAMUSCULAR; INTRAVENOUS; SUBCUTANEOUS
Refills: 0 | Status: DISCONTINUED | OUTPATIENT
Start: 2022-11-25 | End: 2022-11-29

## 2022-11-25 NOTE — ED ADULT NURSE NOTE - OBJECTIVE STATEMENT
Pt a/ox4, presents with intermittent couch, body aches, and generalized weakness, no other signs of acute distress noted.

## 2022-11-25 NOTE — ED PROVIDER NOTE - PROGRESS NOTE DETAILS
Patient doing well, feeling much improved.  No acute complaints or changes at this time.  Discussed with patient regarding CT findings, importance of close prompt follow-up with neurosurgery.  Outpatient coordinator will arrange follow-up for patient.  Discussed with patient regarding all results, nature of findings, importance of close prompt follow-up

## 2022-11-25 NOTE — ED PROVIDER NOTE - ENMT, MLM
Airway patent, Nasal mucosa clear. Mouth with normal mucosa. Throat has no vesicles, no oropharyngeal exudates and uvula is midline.  no ext signs of head trauma, min tend occiput, no crepitus.

## 2022-11-25 NOTE — ED PROVIDER NOTE - NSFOLLOWUPINSTRUCTIONS_ED_ALL_ED_FT
1)  Follow-up with your Primary Medical Doctor. Call today / next business day for prompt follow-up.  2) Follow-up with Cardiology as discussed. Call today / next business day for prompt follow-up and further workup and evaluation.  3) Return to Emergency room for any worsening or persistent pain, shortness of breath, weakness, fever, abdominal pain, dizziness, passing out, unexplained pain in arms, legs, back, any vomiting, feeling like your heart is racing,  or any other concerning symptoms.  4) See attached instruction sheets for additional information, including information regarding signs and symptoms to look out for, reasons to seek immediate care and other important instructions.   5) Follow-up with Neurosurgery as discussed -  [phone]

## 2022-11-25 NOTE — ED PROVIDER NOTE - OBJECTIVE STATEMENT
32-year-old female with no significant past medical history presents with has had URI symptoms for past few days.  Patient with mild cough, congestion.  No known fever.  Today patient was in the bathroom when she banged her elbow on the counter.  Patient felt a sudden rush of pain, then felt lightheaded dizzy, and then passed out for a few seconds.  Family member heard her fall, he came to the room within 10 seconds and patient was then awake.  No chest pain or shortness of breath.  No palpitations.  Patient complains of occipital headache status post hitting her head on the floor.  No other acute injury from the fall.  Patient has not been having any chest pain or shortness of breath.  No recent dyspnea on exertion/easy fatigue.  Patient with some decreased p.o. intake over the past 2 days secondary to her URI.  No other acute injury or complaints.  Patient with a similar episode in the past, with no acute sequela.  Patient vaccinated for COVID, no recent exposures.  No other acute complaints at this time.

## 2022-11-25 NOTE — ED PROVIDER NOTE - CARE PROVIDER_API CALL
TRAMAINE BANG  Internal Medicine  [address] [place] [address]  Phone: [phone]  Fax: [phone]  Follow Up Time:     Shaan Decker)  Cardiovascular Disease; Internal Medicine  [address], [place] [address]  Phone: [phone]  Fax: [phone]  Follow Up Time:

## 2022-12-02 ENCOUNTER — APPOINTMENT (OUTPATIENT)
Dept: MRI IMAGING | Facility: CLINIC | Age: 32
End: 2022-12-02

## 2022-12-02 PROCEDURE — A9585: CPT | Mod: JW

## 2022-12-02 PROCEDURE — 70553 MRI BRAIN STEM W/O & W/DYE: CPT

## 2023-01-23 NOTE — PROGRESS NOTE ADULT - SUBJECTIVE AND OBJECTIVE BOX
Surgery Progress Note     Subjective/24hour Events:   Patient seen and examined.   No acute events overnight.   Pain controlled.     Vital Signs:  Vital Signs Last 24 Hrs  T(C): 36.6 (25 Sep 2019 00:59), Max: 36.8 (24 Sep 2019 09:17)  T(F): 97.9 (25 Sep 2019 00:59), Max: 98.2 (24 Sep 2019 09:17)  HR: 64 (25 Sep 2019 00:59) (56 - 73)  BP: 114/74 (25 Sep 2019 00:59) (102/66 - 114/74)  BP(mean): --  RR: 18 (25 Sep 2019 00:59) (18 - 18)  SpO2: 98% (24 Sep 2019 21:13) (97% - 98%)    CAPILLARY BLOOD GLUCOSE          I&O's Detail      MEDICATIONS  (STANDING):  ceFAZolin   IVPB      ceFAZolin   IVPB 1000 milliGRAM(s) IV Intermittent every 8 hours  heparin  Injectable 5000 Unit(s) SubCutaneous every 12 hours  lactated ringers. 1000 milliLiter(s) (125 mL/Hr) IV Continuous <Continuous>    MEDICATIONS  (PRN):  acetaminophen   Tablet .. 975 milliGRAM(s) Oral every 6 hours PRN Moderate Pain (4 - 6)  ibuprofen  Tablet. 600 milliGRAM(s) Oral every 6 hours PRN Moderate Pain (4 - 6)      Physical Exam:  Gen: NAD.  Lungs: Non labored breathing.   Abdomen: soft, ND, incision erythematous mildly TTP, no active drainage noted from wound, no fluctuance or induration present   Ext: Moves all 4 spontaneously.     Labs:    09-23    143  |  106  |  11  ----------------------------<  80  3.8   |  22  |  0.65    Ca    10.0      23 Sep 2019 17:49    TPro  7.6  /  Alb  4.4  /  TBili  0.2  /  DBili  x   /  AST  17  /  ALT  16  /  AlkPhos  85  09-23    LIVER FUNCTIONS - ( 23 Sep 2019 17:49 )  Alb: 4.4 g/dL / Pro: 7.6 g/dL / ALK PHOS: 85 U/L / ALT: 16 U/L / AST: 17 U/L / GGT: x                                 11.1   7.0   )-----------( 287      ( 24 Sep 2019 07:27 )             34.2     PT/INR - ( 23 Sep 2019 17:49 )   PT: 12.2 sec;   INR: 1.07 ratio         PTT - ( 23 Sep 2019 17:49 )  PTT:37.6 sec guarded

## 2023-02-03 ENCOUNTER — NON-APPOINTMENT (OUTPATIENT)
Age: 33
End: 2023-02-03

## 2023-02-13 ENCOUNTER — APPOINTMENT (OUTPATIENT)
Dept: NEUROSURGERY | Facility: CLINIC | Age: 33
End: 2023-02-13
Payer: COMMERCIAL

## 2023-02-13 VITALS
DIASTOLIC BLOOD PRESSURE: 75 MMHG | HEIGHT: 62 IN | OXYGEN SATURATION: 98 % | TEMPERATURE: 98 F | BODY MASS INDEX: 28.16 KG/M2 | SYSTOLIC BLOOD PRESSURE: 126 MMHG | WEIGHT: 153 LBS | HEART RATE: 84 BPM

## 2023-02-13 DIAGNOSIS — G93.0 CEREBRAL CYSTS: ICD-10-CM

## 2023-02-13 PROCEDURE — 99205 OFFICE O/P NEW HI 60 MIN: CPT

## 2023-02-14 PROBLEM — G93.0 CYST OF BRAIN: Status: ACTIVE | Noted: 2023-02-14

## 2023-02-16 NOTE — PATIENT PROFILE ADULT - OVER THE PAST TWO WEEKS, HAVE YOU FELT LITTLE INTEREST OR PLEASURE IN DOING THINGS?
See Kno message. Please advise if you would like to see pt prior to ordering requested labs.   LOV 09/2022   no

## 2023-02-16 NOTE — ASSESSMENT
[FreeTextEntry1] : 2023\par \par \par \par Re:	Radha Huynh \par :	1990\par \par The patient is a 32-year-old right-handed female who is completely healthy.  She has suffered 2, what she calls, vasovagal events that were syncopal-type events, one 5 years ago and one in 2022, both after suffering severe pain after banging her elbow and another body part in the original incident and having severe pain.  She had loss of consciousness and then recovered.  Her sister had a cyst on the pituitary gland operated by Dr. 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  She is here for a 2nd opinion.  She was advised to have endoscopic drainage of the cyst.\par \par Her past medical history is negative.  Past surgical history is positive for 3 C-sections.  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  She works out, and she is extremely active taking care of her children.  Her sister has a pituitary cyst, and she is status post drainage.  The patient has no allergies and is on no medication.  She has a negative neurologic exam.  In particular, there is no upgaze, paresis, or any issues whatsoever.  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  I would advocate repeating an interview and physical exam with the patient in one year’s time and, of course, repeating the imaging in one year’s time prior to advising for any surgical intervention.\par \par I will see her in one year.\par \par \par \par Sudhir Love M.D., F.A.C.S.\par [facility]\par

## 2023-05-01 ENCOUNTER — NON-APPOINTMENT (OUTPATIENT)
Age: 33
End: 2023-05-01

## 2023-05-01 ENCOUNTER — APPOINTMENT (OUTPATIENT)
Dept: CARDIOLOGY | Facility: CLINIC | Age: 33
End: 2023-05-01
Payer: COMMERCIAL

## 2023-05-01 VITALS
OXYGEN SATURATION: 99 % | DIASTOLIC BLOOD PRESSURE: 75 MMHG | WEIGHT: 158 LBS | HEART RATE: 72 BPM | BODY MASS INDEX: 29.08 KG/M2 | SYSTOLIC BLOOD PRESSURE: 118 MMHG | HEIGHT: 62 IN

## 2023-05-01 DIAGNOSIS — R00.2 PALPITATIONS: ICD-10-CM

## 2023-05-01 PROCEDURE — 99204 OFFICE O/P NEW MOD 45 MIN: CPT | Mod: 25

## 2023-05-01 PROCEDURE — 93306 TTE W/DOPPLER COMPLETE: CPT

## 2023-05-01 PROCEDURE — 93000 ELECTROCARDIOGRAM COMPLETE: CPT

## 2023-05-01 PROCEDURE — 93224 XTRNL ECG REC UP TO 48 HRS: CPT | Mod: 59

## 2023-11-10 ENCOUNTER — APPOINTMENT (OUTPATIENT)
Dept: MRI IMAGING | Facility: CLINIC | Age: 33
End: 2023-11-10
Payer: COMMERCIAL

## 2023-11-10 PROCEDURE — 70553 MRI BRAIN STEM W/O & W/DYE: CPT

## 2023-11-10 PROCEDURE — A9585: CPT

## 2024-04-06 ENCOUNTER — RESULT REVIEW (OUTPATIENT)
Age: 34
End: 2024-04-06

## 2024-04-07 ENCOUNTER — TRANSCRIPTION ENCOUNTER (OUTPATIENT)
Age: 34
End: 2024-04-07

## 2024-04-07 ENCOUNTER — EMERGENCY (EMERGENCY)
Facility: HOSPITAL | Age: 34
LOS: 1 days | Discharge: HOME | End: 2024-04-07
Attending: EMERGENCY MEDICINE | Admitting: EMERGENCY MEDICINE
Payer: COMMERCIAL

## 2024-04-07 ENCOUNTER — INPATIENT (INPATIENT)
Facility: HOSPITAL | Age: 34
LOS: 0 days | Discharge: HOME | DRG: 399 | End: 2024-04-08
Attending: SURGERY | Admitting: SURGERY
Payer: COMMERCIAL

## 2024-04-07 VITALS
SYSTOLIC BLOOD PRESSURE: 115 MMHG | OXYGEN SATURATION: 99 % | HEIGHT: 62 IN | HEART RATE: 68 BPM | TEMPERATURE: 98 F | DIASTOLIC BLOOD PRESSURE: 73 MMHG | WEIGHT: 153 LBS | RESPIRATION RATE: 15 BRPM

## 2024-04-07 VITALS
DIASTOLIC BLOOD PRESSURE: 70 MMHG | TEMPERATURE: 99 F | WEIGHT: 157.19 LBS | HEART RATE: 92 BPM | HEIGHT: 62 IN | SYSTOLIC BLOOD PRESSURE: 119 MMHG | RESPIRATION RATE: 18 BRPM | OXYGEN SATURATION: 99 %

## 2024-04-07 VITALS
DIASTOLIC BLOOD PRESSURE: 71 MMHG | SYSTOLIC BLOOD PRESSURE: 117 MMHG | HEART RATE: 75 BPM | OXYGEN SATURATION: 99 % | RESPIRATION RATE: 16 BRPM | TEMPERATURE: 98 F

## 2024-04-07 DIAGNOSIS — Z98.89 OTHER SPECIFIED POSTPROCEDURAL STATES: Chronic | ICD-10-CM

## 2024-04-07 DIAGNOSIS — K37 UNSPECIFIED APPENDICITIS: ICD-10-CM

## 2024-04-07 LAB
ALBUMIN SERPL ELPH-MCNC: 3.4 G/DL — SIGNIFICANT CHANGE UP (ref 3.3–5)
ALBUMIN SERPL ELPH-MCNC: 4.4 G/DL — SIGNIFICANT CHANGE UP (ref 3.3–5)
ALP SERPL-CCNC: 50 U/L — SIGNIFICANT CHANGE UP (ref 30–120)
ALP SERPL-CCNC: 64 U/L — SIGNIFICANT CHANGE UP (ref 30–120)
ALT FLD-CCNC: 14 U/L — SIGNIFICANT CHANGE UP (ref 10–60)
ALT FLD-CCNC: 23 U/L — SIGNIFICANT CHANGE UP (ref 10–60)
ANION GAP SERPL CALC-SCNC: 14 MMOL/L — SIGNIFICANT CHANGE UP (ref 5–17)
ANION GAP SERPL CALC-SCNC: 9 MMOL/L — SIGNIFICANT CHANGE UP (ref 5–17)
APPEARANCE UR: CLEAR — SIGNIFICANT CHANGE UP
APTT BLD: 30.7 SEC — SIGNIFICANT CHANGE UP (ref 24.5–35.6)
AST SERPL-CCNC: 16 U/L — SIGNIFICANT CHANGE UP (ref 10–40)
AST SERPL-CCNC: 21 U/L — SIGNIFICANT CHANGE UP (ref 10–40)
BACTERIA # UR AUTO: ABNORMAL /HPF
BASOPHILS # BLD AUTO: 0.03 K/UL — SIGNIFICANT CHANGE UP (ref 0–0.2)
BASOPHILS # BLD AUTO: 0.07 K/UL — SIGNIFICANT CHANGE UP (ref 0–0.2)
BASOPHILS NFR BLD AUTO: 0.2 % — SIGNIFICANT CHANGE UP (ref 0–2)
BASOPHILS NFR BLD AUTO: 0.3 % — SIGNIFICANT CHANGE UP (ref 0–2)
BILIRUB SERPL-MCNC: 0.8 MG/DL — SIGNIFICANT CHANGE UP (ref 0.2–1.2)
BILIRUB SERPL-MCNC: 1.2 MG/DL — SIGNIFICANT CHANGE UP (ref 0.2–1.2)
BILIRUB UR-MCNC: NEGATIVE — SIGNIFICANT CHANGE UP
BUN SERPL-MCNC: 16 MG/DL — SIGNIFICANT CHANGE UP (ref 7–23)
BUN SERPL-MCNC: 8 MG/DL — SIGNIFICANT CHANGE UP (ref 7–23)
CALCIUM SERPL-MCNC: 8.3 MG/DL — LOW (ref 8.4–10.5)
CALCIUM SERPL-MCNC: 9.6 MG/DL — SIGNIFICANT CHANGE UP (ref 8.4–10.5)
CHLORIDE SERPL-SCNC: 103 MMOL/L — SIGNIFICANT CHANGE UP (ref 96–108)
CHLORIDE SERPL-SCNC: 107 MMOL/L — SIGNIFICANT CHANGE UP (ref 96–108)
CO2 SERPL-SCNC: 19 MMOL/L — LOW (ref 22–31)
CO2 SERPL-SCNC: 25 MMOL/L — SIGNIFICANT CHANGE UP (ref 22–31)
COLOR SPEC: YELLOW — SIGNIFICANT CHANGE UP
CREAT SERPL-MCNC: 0.65 MG/DL — SIGNIFICANT CHANGE UP (ref 0.5–1.3)
CREAT SERPL-MCNC: 0.7 MG/DL — SIGNIFICANT CHANGE UP (ref 0.5–1.3)
DIFF PNL FLD: ABNORMAL
EGFR: 116 ML/MIN/1.73M2 — SIGNIFICANT CHANGE UP
EGFR: 118 ML/MIN/1.73M2 — SIGNIFICANT CHANGE UP
EOSINOPHIL # BLD AUTO: 0.03 K/UL — SIGNIFICANT CHANGE UP (ref 0–0.5)
EOSINOPHIL # BLD AUTO: 0.07 K/UL — SIGNIFICANT CHANGE UP (ref 0–0.5)
EOSINOPHIL NFR BLD AUTO: 0.2 % — SIGNIFICANT CHANGE UP (ref 0–6)
EOSINOPHIL NFR BLD AUTO: 0.3 % — SIGNIFICANT CHANGE UP (ref 0–6)
EPI CELLS # UR: SIGNIFICANT CHANGE UP
GLUCOSE SERPL-MCNC: 104 MG/DL — HIGH (ref 70–99)
GLUCOSE SERPL-MCNC: 132 MG/DL — HIGH (ref 70–99)
GLUCOSE UR QL: NEGATIVE MG/DL — SIGNIFICANT CHANGE UP
HCG UR QL: NEGATIVE — SIGNIFICANT CHANGE UP
HCT VFR BLD CALC: 34.9 % — SIGNIFICANT CHANGE UP (ref 34.5–45)
HCT VFR BLD CALC: 40 % — SIGNIFICANT CHANGE UP (ref 34.5–45)
HGB BLD-MCNC: 12 G/DL — SIGNIFICANT CHANGE UP (ref 11.5–15.5)
HGB BLD-MCNC: 14.2 G/DL — SIGNIFICANT CHANGE UP (ref 11.5–15.5)
IMM GRANULOCYTES NFR BLD AUTO: 0.3 % — SIGNIFICANT CHANGE UP (ref 0–0.9)
IMM GRANULOCYTES NFR BLD AUTO: 0.3 % — SIGNIFICANT CHANGE UP (ref 0–0.9)
INR BLD: 1.06 RATIO — SIGNIFICANT CHANGE UP (ref 0.85–1.18)
KETONES UR-MCNC: 15 MG/DL
LEUKOCYTE ESTERASE UR-ACNC: NEGATIVE — SIGNIFICANT CHANGE UP
LIDOCAIN IGE QN: 28 U/L — SIGNIFICANT CHANGE UP (ref 16–77)
LIDOCAIN IGE QN: 46 U/L — SIGNIFICANT CHANGE UP (ref 16–77)
LYMPHOCYTES # BLD AUTO: 1.48 K/UL — SIGNIFICANT CHANGE UP (ref 1–3.3)
LYMPHOCYTES # BLD AUTO: 1.6 K/UL — SIGNIFICANT CHANGE UP (ref 1–3.3)
LYMPHOCYTES # BLD AUTO: 7.7 % — LOW (ref 13–44)
LYMPHOCYTES # BLD AUTO: 9.9 % — LOW (ref 13–44)
MCHC RBC-ENTMCNC: 30.2 PG — SIGNIFICANT CHANGE UP (ref 27–34)
MCHC RBC-ENTMCNC: 30.5 PG — SIGNIFICANT CHANGE UP (ref 27–34)
MCHC RBC-ENTMCNC: 34.4 GM/DL — SIGNIFICANT CHANGE UP (ref 32–36)
MCHC RBC-ENTMCNC: 35.5 GM/DL — SIGNIFICANT CHANGE UP (ref 32–36)
MCV RBC AUTO: 85.8 FL — SIGNIFICANT CHANGE UP (ref 80–100)
MCV RBC AUTO: 87.7 FL — SIGNIFICANT CHANGE UP (ref 80–100)
MONOCYTES # BLD AUTO: 0.7 K/UL — SIGNIFICANT CHANGE UP (ref 0–0.9)
MONOCYTES # BLD AUTO: 0.76 K/UL — SIGNIFICANT CHANGE UP (ref 0–0.9)
MONOCYTES NFR BLD AUTO: 3.4 % — SIGNIFICANT CHANGE UP (ref 2–14)
MONOCYTES NFR BLD AUTO: 5.1 % — SIGNIFICANT CHANGE UP (ref 2–14)
NEUTROPHILS # BLD AUTO: 12.61 K/UL — HIGH (ref 1.8–7.4)
NEUTROPHILS # BLD AUTO: 18.3 K/UL — HIGH (ref 1.8–7.4)
NEUTROPHILS NFR BLD AUTO: 84.3 % — HIGH (ref 43–77)
NEUTROPHILS NFR BLD AUTO: 88 % — HIGH (ref 43–77)
NITRITE UR-MCNC: NEGATIVE — SIGNIFICANT CHANGE UP
NRBC # BLD: 0 /100 WBCS — SIGNIFICANT CHANGE UP (ref 0–0)
NRBC # BLD: 0 /100 WBCS — SIGNIFICANT CHANGE UP (ref 0–0)
PH UR: 6 — SIGNIFICANT CHANGE UP (ref 5–8)
PLATELET # BLD AUTO: 266 K/UL — SIGNIFICANT CHANGE UP (ref 150–400)
PLATELET # BLD AUTO: 338 K/UL — SIGNIFICANT CHANGE UP (ref 150–400)
POTASSIUM SERPL-MCNC: 3.5 MMOL/L — SIGNIFICANT CHANGE UP (ref 3.5–5.3)
POTASSIUM SERPL-MCNC: 3.7 MMOL/L — SIGNIFICANT CHANGE UP (ref 3.5–5.3)
POTASSIUM SERPL-SCNC: 3.5 MMOL/L — SIGNIFICANT CHANGE UP (ref 3.5–5.3)
POTASSIUM SERPL-SCNC: 3.7 MMOL/L — SIGNIFICANT CHANGE UP (ref 3.5–5.3)
PROT SERPL-MCNC: 6.4 G/DL — SIGNIFICANT CHANGE UP (ref 6–8.3)
PROT SERPL-MCNC: 8 G/DL — SIGNIFICANT CHANGE UP (ref 6–8.3)
PROT UR-MCNC: NEGATIVE MG/DL — SIGNIFICANT CHANGE UP
PROTHROM AB SERPL-ACNC: 11.5 SEC — SIGNIFICANT CHANGE UP (ref 9.5–13)
RBC # BLD: 3.98 M/UL — SIGNIFICANT CHANGE UP (ref 3.8–5.2)
RBC # BLD: 4.66 M/UL — SIGNIFICANT CHANGE UP (ref 3.8–5.2)
RBC # FLD: 12.3 % — SIGNIFICANT CHANGE UP (ref 10.3–14.5)
RBC # FLD: 12.5 % — SIGNIFICANT CHANGE UP (ref 10.3–14.5)
RBC CASTS # UR COMP ASSIST: 3 /HPF — SIGNIFICANT CHANGE UP (ref 0–4)
SODIUM SERPL-SCNC: 136 MMOL/L — SIGNIFICANT CHANGE UP (ref 135–145)
SODIUM SERPL-SCNC: 141 MMOL/L — SIGNIFICANT CHANGE UP (ref 135–145)
SP GR SPEC: 1.03 — SIGNIFICANT CHANGE UP (ref 1–1.03)
UROBILINOGEN FLD QL: 0.2 MG/DL — SIGNIFICANT CHANGE UP (ref 0.2–1)
WBC # BLD: 14.96 K/UL — HIGH (ref 3.8–10.5)
WBC # BLD: 20.81 K/UL — HIGH (ref 3.8–10.5)
WBC # FLD AUTO: 14.96 K/UL — HIGH (ref 3.8–10.5)
WBC # FLD AUTO: 20.81 K/UL — HIGH (ref 3.8–10.5)
WBC UR QL: 3 /HPF — SIGNIFICANT CHANGE UP (ref 0–5)

## 2024-04-07 PROCEDURE — 83690 ASSAY OF LIPASE: CPT

## 2024-04-07 PROCEDURE — 99284 EMERGENCY DEPT VISIT MOD MDM: CPT

## 2024-04-07 PROCEDURE — 99285 EMERGENCY DEPT VISIT HI MDM: CPT

## 2024-04-07 PROCEDURE — 85730 THROMBOPLASTIN TIME PARTIAL: CPT

## 2024-04-07 PROCEDURE — 85025 COMPLETE CBC W/AUTO DIFF WBC: CPT

## 2024-04-07 PROCEDURE — 85610 PROTHROMBIN TIME: CPT

## 2024-04-07 PROCEDURE — 81001 URINALYSIS AUTO W/SCOPE: CPT

## 2024-04-07 PROCEDURE — 96374 THER/PROPH/DIAG INJ IV PUSH: CPT

## 2024-04-07 PROCEDURE — 76705 ECHO EXAM OF ABDOMEN: CPT

## 2024-04-07 PROCEDURE — 81025 URINE PREGNANCY TEST: CPT

## 2024-04-07 PROCEDURE — 80053 COMPREHEN METABOLIC PANEL: CPT

## 2024-04-07 PROCEDURE — 76705 ECHO EXAM OF ABDOMEN: CPT | Mod: 26

## 2024-04-07 PROCEDURE — 99284 EMERGENCY DEPT VISIT MOD MDM: CPT | Mod: 25

## 2024-04-07 PROCEDURE — 88304 TISSUE EXAM BY PATHOLOGIST: CPT | Mod: 26

## 2024-04-07 PROCEDURE — 96375 TX/PRO/DX INJ NEW DRUG ADDON: CPT

## 2024-04-07 PROCEDURE — 74177 CT ABD & PELVIS W/CONTRAST: CPT | Mod: 26,MC

## 2024-04-07 PROCEDURE — 36415 COLL VENOUS BLD VENIPUNCTURE: CPT

## 2024-04-07 DEVICE — STAPLER COVIDIEN TRI-STAPLE 45MM PURPLE RELOAD: Type: IMPLANTABLE DEVICE | Site: ABDOMINAL | Status: FUNCTIONAL

## 2024-04-07 DEVICE — STAPLER COVIDIEN TRI-STAPLE 60MM PURPLE RELOAD: Type: IMPLANTABLE DEVICE | Site: ABDOMINAL | Status: FUNCTIONAL

## 2024-04-07 RX ORDER — SODIUM CHLORIDE 9 MG/ML
1000 INJECTION INTRAMUSCULAR; INTRAVENOUS; SUBCUTANEOUS ONCE
Refills: 0 | Status: COMPLETED | OUTPATIENT
Start: 2024-04-07 | End: 2024-04-07

## 2024-04-07 RX ORDER — ACETAMINOPHEN 500 MG
1000 TABLET ORAL ONCE
Refills: 0 | Status: COMPLETED | OUTPATIENT
Start: 2024-04-07 | End: 2024-04-07

## 2024-04-07 RX ORDER — MORPHINE SULFATE 50 MG/1
4 CAPSULE, EXTENDED RELEASE ORAL ONCE
Refills: 0 | Status: DISCONTINUED | OUTPATIENT
Start: 2024-04-07 | End: 2024-04-07

## 2024-04-07 RX ORDER — PANTOPRAZOLE SODIUM 20 MG/1
40 TABLET, DELAYED RELEASE ORAL ONCE
Refills: 0 | Status: COMPLETED | OUTPATIENT
Start: 2024-04-07 | End: 2024-04-07

## 2024-04-07 RX ORDER — ONDANSETRON 8 MG/1
4 TABLET, FILM COATED ORAL ONCE
Refills: 0 | Status: COMPLETED | OUTPATIENT
Start: 2024-04-07 | End: 2024-04-07

## 2024-04-07 RX ORDER — ONDANSETRON 8 MG/1
1 TABLET, FILM COATED ORAL
Qty: 1 | Refills: 0
Start: 2024-04-07

## 2024-04-07 RX ORDER — PIPERACILLIN AND TAZOBACTAM 4; .5 G/20ML; G/20ML
3.38 INJECTION, POWDER, LYOPHILIZED, FOR SOLUTION INTRAVENOUS ONCE
Refills: 0 | Status: COMPLETED | OUTPATIENT
Start: 2024-04-07 | End: 2024-04-07

## 2024-04-07 RX ADMIN — SODIUM CHLORIDE 1000 MILLILITER(S): 9 INJECTION INTRAMUSCULAR; INTRAVENOUS; SUBCUTANEOUS at 20:10

## 2024-04-07 RX ADMIN — ONDANSETRON 4 MILLIGRAM(S): 8 TABLET, FILM COATED ORAL at 09:12

## 2024-04-07 RX ADMIN — PANTOPRAZOLE SODIUM 40 MILLIGRAM(S): 20 TABLET, DELAYED RELEASE ORAL at 09:12

## 2024-04-07 RX ADMIN — MORPHINE SULFATE 4 MILLIGRAM(S): 50 CAPSULE, EXTENDED RELEASE ORAL at 21:54

## 2024-04-07 RX ADMIN — SODIUM CHLORIDE 1000 MILLILITER(S): 9 INJECTION INTRAMUSCULAR; INTRAVENOUS; SUBCUTANEOUS at 09:00

## 2024-04-07 RX ADMIN — Medication 400 MILLIGRAM(S): at 09:13

## 2024-04-07 RX ADMIN — PIPERACILLIN AND TAZOBACTAM 200 GRAM(S): 4; .5 INJECTION, POWDER, LYOPHILIZED, FOR SOLUTION INTRAVENOUS at 21:33

## 2024-04-07 NOTE — DISCHARGE NOTE PROVIDER - NSDCMRMEDTOKEN_GEN_ALL_CORE_FT
acetaminophen 325 mg oral tablet: 3 tab(s) orally   cephalexin 500 mg oral capsule: 1 cap(s) orally 4 times a day  docusate sodium 100 mg oral capsule: 1 cap(s) orally 2 times a day, As needed, Stool softening  ibuprofen 200 mg oral tablet: 3 tab(s) orally every 6 hours  magnesium hydroxide 8% oral suspension: 30 milliliter(s) orally 2 times a day, As needed, Constipation  ondansetron 4 mg oral tablet, disintegratin tab(s) orally every 4 hours as needed for  nausea  Prenatal Multivitamins with Folic Acid 1 mg oral tablet: 1 tab(s) orally once a day

## 2024-04-07 NOTE — ED PROVIDER NOTE - CLINICAL SUMMARY MEDICAL DECISION MAKING FREE TEXT BOX
Patient with lower abdominal pain and tenderness.  Seen earlier in the emergency department and found to have an elevated white blood cell count.  Patient had normal right upper quadrant sonogram at that time.  Will get CAT scan.  Patient does not want anything for pain or nausea at this time.  Further treatment and disposition to be based on findings.

## 2024-04-07 NOTE — ED ADULT TRIAGE NOTE - CHIEF COMPLAINT QUOTE
at 0300 I was vomiting; was seen in ED and vomiting stopped; now I have b/l lower abdominal pain; US of gallbladder is negative

## 2024-04-07 NOTE — H&P ADULT - HISTORY OF PRESENT ILLNESS
Pt is a 34 y/o F who presented with abdominal pain that started approximately 20 hours ago.  Pain initially was in the lower abdomen described as crampy.    Patient then developed sharp epigastric pain.  Came to the emergency department.  Had an ultrasound of the right upper quadrant which was normal and labs which showed an elevated white blood cell count.    Patient was discharged with presumed viral enteritis.  Patient states the pain is now worsening and is more in the lower abdomen.    No fever.  Patient had been vomiting all day but states she has not vomited in a while.  No diarrhea.    Patient has a history of C-sections but no other surgical history.

## 2024-04-07 NOTE — H&P ADULT - NSHPPHYSICALEXAM_GEN_ALL_CORE
.  VITAL SIGNS:  T(C): 37.1 (04-07-24 @ 22:53), Max: 37.1 (04-07-24 @ 19:39)  T(F): 98.7 (04-07-24 @ 22:53), Max: 98.7 (04-07-24 @ 19:39)  HR: 92 (04-07-24 @ 22:53) (68 - 92)  BP: 119/70 (04-07-24 @ 22:53) (112/71 - 119/70)  BP(mean): 86 (04-07-24 @ 22:53) (86 - 86)  RR: 18 (04-07-24 @ 22:53) (15 - 18)  SpO2: 99% (04-07-24 @ 22:53) (99% - 100%)  Wt(kg): --    PHYSICAL EXAM:    Constitutional:  NAD, resting comfortably in bed  Head: NC/AT  Eyes: PERRL b/l  ENT: MMM  Neck: supple; no JVD or thyromegaly  Respiratory: CTA B/L   Cardiac: +S1/S2; RRR   Gastrointestinal: soft, tender throughout abdomen, R>L

## 2024-04-07 NOTE — ED ADULT NURSE NOTE - OBJECTIVE STATEMENT
Pt came in ambulatory complains of epigastric pain  , nausea and vomiting several times started at 3 am today. Pt came in ambulatory complains of epigastric pain  , nausea and vomiting several times started at 3 am today. No fever or chills

## 2024-04-07 NOTE — ED ADULT NURSE NOTE - NSFALLUNIVINTERV_ED_ALL_ED
<-- Click to add NO pertinent Family History
Bed/Stretcher in lowest position, wheels locked, appropriate side rails in place/Call bell, personal items and telephone in reach/Instruct patient to call for assistance before getting out of bed/chair/stretcher/Non-slip footwear applied when patient is off stretcher/[place] to call system/Physically safe environment - no spills, clutter or unnecessary equipment/Purposeful proactive rounding/Room/bathroom lighting operational, light cord in reach

## 2024-04-07 NOTE — ED ADULT NURSE NOTE - BIRTH SEX
Addendum Note by Norman Weinberg CRNA at 12/8/2020  3:20 PM     Author: Norman Weinberg CRNA Service: -- Author Type: Nurse Anesthetist    Filed: 12/8/2020  3:20 PM Date of Service: 12/8/2020  3:20 PM Status: Signed    : Norman Weinberg CRNA (Nurse Anesthetist)       Addendum  created 12/08/20 1520 by Norman Weinberg CRNA    Intraprocedure Meds edited            Female

## 2024-04-07 NOTE — ED PROVIDER NOTE - CLINICAL SUMMARY MEDICAL DECISION MAKING FREE TEXT BOX
33-year-old female with no significant past medical history complaining epigastric pain and vomiting since 3 AM.  States she ate a salad for dinner and felt fine when she went to bed.  Denies sick contacts.  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  Denies fever cough shortness of breath chest pain diarrhea dysuria hematuria or pregnancy.  Took Maalox at home without improvement.  Describes vomit as saliva nonbilious nonbloody.    Physical exam reveals epigastric tenderness but no rebound or mass.  Impression is vomiting likely gastritis but will rule out gallbladder disease.  Plan labs normal saline Protonix Zofran and right upper quadrant ultrasound.

## 2024-04-07 NOTE — DISCHARGE NOTE PROVIDER - HOSPITAL COURSE
The patient is a 34 y/o f who presented to ED on 4/7/24 with acute abdominal pain. CT abdomen/pelvis IMPRESSION: Acute appendicitis, without perforation or abscess. Surgical consultation recommended. Heterogeneous appearance of the uterus with cystic changes in the bilateral adnexa.     After admission and receiving preoperative parenteral prophylactic antibiotics, the patient underwent an uneventful and uncomplicated Laparoscopic appendectomy by Dr Adrian.   After completion of surgery and emergence from anesthesia, the patient was transferred to the PACU for routine hemodynamic, airway, and pain monitoring.  After a stable PACU course, the patient was transferred to the surgical unit for acute post-operative care. The patient had a stable and uncomplicated hospital course with no medical complications.      A course of post-op parenteral antibiotics was given to complete surgical prophylaxis.  Pre-operative medications were continued during the post-op course, and adjusted if medically needed.  A medical consultation from the Hospitalist service was obtained for post-operative medical co-management.    Lab work ordering/results was followed by the Medical/Surgical team.  The patient had a clean appearing surgical incision with no sign of surgical site infections.  Discharge instructions were delineated in the Discharge Plan and reviewed with the patient.  All medications were listed in the medication reconciliation document, and lockhart points were reviewed with the patient.  The patient was deemed stable for discharge today (date).  Upon discharge, the patient will following up with Dr Adrian whose office number may be found on the discharge instructions.   The patient is a 32 y/o f who presented to ED on 4/7/24 with acute abdominal pain. CT abdomen/pelvis IMPRESSION: Acute appendicitis, without perforation or abscess. Surgical consultation recommended. Heterogeneous appearance of the uterus with cystic changes in the bilateral adnexa.     After admission and receiving preoperative parenteral prophylactic antibiotics, the patient underwent an uneventful and uncomplicated Laparoscopic appendectomy by Dr Adrian.   After completion of surgery and emergence from anesthesia, the patient was transferred to the PACU for routine hemodynamic, airway, and pain monitoring.  After a stable PACU course, the patient was transferred to the surgical unit for acute post-operative care. The patient had a stable and uncomplicated hospital course with no medical complications.      A course of post-op parenteral antibiotics was given to complete surgical prophylaxis.  Pre-operative medications were continued during the post-op course, and adjusted if medically needed.  Medical consultation from the Hospitalist service was available for post-operative medical co-management.    Lab work ordering/results was followed by the Medical/Surgical team.  The patient had clean appearing surgical incisions with no sign of surgical site infections.  Discharge instructions were delineated in the Discharge Plan and reviewed with the patient.  All medications were listed in the medication reconciliation document, and lockhart points were reviewed with the patient.  The patient was deemed stable for discharge today 4/8/24.  Upon discharge, the patient will following up with Dr Adrian whose office number may be found on the discharge instructions.   32 y/o WF with no PMHx admitted to [facility] on 4/7/2024 with acute appendicitis and underwent laparoscopic appendectomy on 4/7/2024. Patient tolerated procedure well and progressed appropriately. Currently tolerating regular diet, voiding independently, having bowel movements and ambulating. Discharged on 4/8/2024 with home medications, incentive spirometer and PRESCRIPTIONS/MEDS TO BED to follow-up with SURGEON/Dr. YVETTE Adrian in 7-10 days.

## 2024-04-07 NOTE — ED ADULT NURSE NOTE - NSFALLUNIVINTERV_ED_ALL_ED
Bed/Stretcher in lowest position, wheels locked, appropriate side rails in place/Call bell, personal items and telephone in reach/Instruct patient to call for assistance before getting out of bed/chair/stretcher/Non-slip footwear applied when patient is off stretcher/[place] to call system/Physically safe environment - no spills, clutter or unnecessary equipment/Purposeful proactive rounding/Room/bathroom lighting operational, light cord in reach

## 2024-04-07 NOTE — ED ADULT NURSE NOTE - CHIEF COMPLAINT QUOTE
at 0300 I was vomiting; was seen in ED and vomiting stopped; now I have b/l lower abdominal pain; US of gallbladder is negative
Statement Selected

## 2024-04-07 NOTE — H&P ADULT - NSHPLABSRESULTS_GEN_ALL_CORE
12.0   14.96 )-----------( 266      ( 07 Apr 2024 20:03 )             34.9   04-07    141  |  107  |  8   ----------------------------<  104<H>  3.5   |  25  |  0.65    Ca    8.3<L>      07 Apr 2024 20:03    TPro  6.4  /  Alb  3.4  /  TBili  1.2  /  DBili  x   /  AST  16  /  ALT  14  /  AlkPhos  50  04-07

## 2024-04-07 NOTE — ED PROVIDER NOTE - CARE PROVIDER_API CALL
Amor Kemp  Gastroenterology  [address] [place] [address]  Phone: [phone]  Fax: [phone]  Follow Up Time: 1-3 Days

## 2024-04-07 NOTE — ED PROVIDER NOTE - OBJECTIVE STATEMENT
She presents with abdominal pain that started approximately 20 hours ago.  Pain initially was in the lower abdomen described as crampy.  Patient then developed sharp epigastric pain.  Came to the emergency department.  Had an ultrasound of the right upper quadrant which was normal and labs which showed an elevated white blood cell count.  Patient was discharged with presumed viral enteritis.  Patient states the pain is now worsening and is more in the lower abdomen.  No fever.  Patient had been vomiting all day but states she has not vomited in a while.  No diarrhea.  Patient has a history of C-sections but no other surgical history.

## 2024-04-07 NOTE — ED PROVIDER NOTE - CROS ED SKIN ALL NEG
Message reviewed.    1. I will wait for Oxcarbazepine level before making dose adjustment.     negative...

## 2024-04-07 NOTE — DISCHARGE NOTE PROVIDER - CARE PROVIDER_API CALL
Zohaib Adrian Simms  Surgery  [address], [address]  [place] [address]  Phone: [phone]  Fax: [phone]  Follow Up Time:

## 2024-04-07 NOTE — ED PROVIDER NOTE - PATIENT PORTAL LINK FT
You can access the FollowMyHealth Patient Portal offered by [facility] by registering at the following website: [email or web address]. By joining [employer]’s FollowMyHealth portal, you will also be able to view your health information using other applications (apps) compatible with our system.

## 2024-04-07 NOTE — DISCHARGE NOTE PROVIDER - NSDCFUADDINST_GEN_ALL_CORE_FT
Follow all verbal and written instructions. Take medications as prescribed. DO NOT drive, operate machinery, and/or make important decisions while on prescription pain medication. DO NOT hesitate to call Doctor's office with questions or concerns. Certain prescription pain medication can cause constipation; take a stool softener such as Colace 100mg 3 x a day, to avoid the constipating effects of prescription pain medication.  For the next 24-48 hours while awake, alternate ice pack 15 minutes on & 15 minutes off  Call Dr. Adrian's office at [phone] to make an appointment to be seen in 7-10 days.   REST! Apply ice packs to incision sites 20 mins on, 20 mins off every 4 hours for the first 24 hours.    If taking narcotic pain medications, may take COLACE (OTC stool softener) as directed to prevent constipation.    May take over-the-counter acetaminophen/Tylenol and/or ibuprofen/Motrin as directed for mild to moderate pain.  Increase ambulation and utilize incentive spirometer as directed.

## 2024-04-08 ENCOUNTER — TRANSCRIPTION ENCOUNTER (OUTPATIENT)
Age: 34
End: 2024-04-08

## 2024-04-08 VITALS — DIASTOLIC BLOOD PRESSURE: 61 MMHG | SYSTOLIC BLOOD PRESSURE: 101 MMHG | HEART RATE: 80 BPM

## 2024-04-08 LAB
ANION GAP SERPL CALC-SCNC: 8 MMOL/L — SIGNIFICANT CHANGE UP (ref 5–17)
BUN SERPL-MCNC: 6 MG/DL — LOW (ref 7–23)
CALCIUM SERPL-MCNC: 8.4 MG/DL — SIGNIFICANT CHANGE UP (ref 8.4–10.5)
CHLORIDE SERPL-SCNC: 107 MMOL/L — SIGNIFICANT CHANGE UP (ref 96–108)
CO2 SERPL-SCNC: 26 MMOL/L — SIGNIFICANT CHANGE UP (ref 22–31)
CREAT SERPL-MCNC: 0.71 MG/DL — SIGNIFICANT CHANGE UP (ref 0.5–1.3)
EGFR: 114 ML/MIN/1.73M2 — SIGNIFICANT CHANGE UP
GLUCOSE SERPL-MCNC: 139 MG/DL — HIGH (ref 70–99)
HCT VFR BLD CALC: 34 % — LOW (ref 34.5–45)
HGB BLD-MCNC: 11.4 G/DL — LOW (ref 11.5–15.5)
MCHC RBC-ENTMCNC: 30 PG — SIGNIFICANT CHANGE UP (ref 27–34)
MCHC RBC-ENTMCNC: 33.5 GM/DL — SIGNIFICANT CHANGE UP (ref 32–36)
MCV RBC AUTO: 89.5 FL — SIGNIFICANT CHANGE UP (ref 80–100)
NRBC # BLD: 0 /100 WBCS — SIGNIFICANT CHANGE UP (ref 0–0)
PLATELET # BLD AUTO: 249 K/UL — SIGNIFICANT CHANGE UP (ref 150–400)
POTASSIUM SERPL-MCNC: 3.8 MMOL/L — SIGNIFICANT CHANGE UP (ref 3.5–5.3)
POTASSIUM SERPL-SCNC: 3.8 MMOL/L — SIGNIFICANT CHANGE UP (ref 3.5–5.3)
RBC # BLD: 3.8 M/UL — SIGNIFICANT CHANGE UP (ref 3.8–5.2)
RBC # FLD: 12.9 % — SIGNIFICANT CHANGE UP (ref 10.3–14.5)
SODIUM SERPL-SCNC: 141 MMOL/L — SIGNIFICANT CHANGE UP (ref 135–145)
WBC # BLD: 11.6 K/UL — HIGH (ref 3.8–10.5)
WBC # FLD AUTO: 11.6 K/UL — HIGH (ref 3.8–10.5)

## 2024-04-08 PROCEDURE — 88304 TISSUE EXAM BY PATHOLOGIST: CPT

## 2024-04-08 PROCEDURE — 80053 COMPREHEN METABOLIC PANEL: CPT

## 2024-04-08 PROCEDURE — 96374 THER/PROPH/DIAG INJ IV PUSH: CPT

## 2024-04-08 PROCEDURE — 99285 EMERGENCY DEPT VISIT HI MDM: CPT | Mod: 25

## 2024-04-08 PROCEDURE — C1889: CPT

## 2024-04-08 PROCEDURE — 83690 ASSAY OF LIPASE: CPT

## 2024-04-08 PROCEDURE — 85025 COMPLETE CBC W/AUTO DIFF WBC: CPT

## 2024-04-08 PROCEDURE — 74177 CT ABD & PELVIS W/CONTRAST: CPT | Mod: MC

## 2024-04-08 PROCEDURE — 36415 COLL VENOUS BLD VENIPUNCTURE: CPT

## 2024-04-08 PROCEDURE — 85027 COMPLETE CBC AUTOMATED: CPT

## 2024-04-08 PROCEDURE — 80048 BASIC METABOLIC PNL TOTAL CA: CPT

## 2024-04-08 RX ORDER — ACETAMINOPHEN 500 MG
1000 TABLET ORAL EVERY 6 HOURS
Refills: 0 | Status: DISCONTINUED | OUTPATIENT
Start: 2024-04-08 | End: 2024-04-08

## 2024-04-08 RX ORDER — OXYCODONE AND ACETAMINOPHEN 5; 325 MG/1; MG/1
2 TABLET ORAL EVERY 6 HOURS
Refills: 0 | Status: DISCONTINUED | OUTPATIENT
Start: 2024-04-08 | End: 2024-04-08

## 2024-04-08 RX ORDER — SODIUM CHLORIDE 9 MG/ML
1000 INJECTION, SOLUTION INTRAVENOUS
Refills: 0 | Status: DISCONTINUED | OUTPATIENT
Start: 2024-04-08 | End: 2024-04-08

## 2024-04-08 RX ORDER — HYDROMORPHONE HYDROCHLORIDE 2 MG/ML
0.2 INJECTION INTRAMUSCULAR; INTRAVENOUS; SUBCUTANEOUS EVERY 4 HOURS
Refills: 0 | Status: DISCONTINUED | OUTPATIENT
Start: 2024-04-08 | End: 2024-04-08

## 2024-04-08 RX ORDER — OXYCODONE AND ACETAMINOPHEN 5; 325 MG/1; MG/1
1 TABLET ORAL EVERY 4 HOURS
Refills: 0 | Status: DISCONTINUED | OUTPATIENT
Start: 2024-04-08 | End: 2024-04-08

## 2024-04-08 RX ORDER — PIPERACILLIN AND TAZOBACTAM 4; .5 G/20ML; G/20ML
3.38 INJECTION, POWDER, LYOPHILIZED, FOR SOLUTION INTRAVENOUS EVERY 8 HOURS
Refills: 0 | Status: DISCONTINUED | OUTPATIENT
Start: 2024-04-08 | End: 2024-04-08

## 2024-04-08 RX ADMIN — Medication 400 MILLIGRAM(S): at 11:55

## 2024-04-08 RX ADMIN — PIPERACILLIN AND TAZOBACTAM 25 GRAM(S): 4; .5 INJECTION, POWDER, LYOPHILIZED, FOR SOLUTION INTRAVENOUS at 06:03

## 2024-04-08 RX ADMIN — Medication 1000 MILLIGRAM(S): at 06:05

## 2024-04-08 RX ADMIN — Medication 400 MILLIGRAM(S): at 06:02

## 2024-04-08 RX ADMIN — Medication 1000 MILLIGRAM(S): at 12:02

## 2024-04-08 NOTE — PROGRESS NOTE ADULT - ASSESSMENT
A/P: POD#1, progressing well, labs/vitals reassuring,    Continue current care  Diet - advance diet to regular as tolerated  GI/DVT prophylaxis  Analgesia prn  OOB/ambulation on the floor  Incentive spirometry/Cough/Deep breathing exercises  Continue abx  d/c planning for this afternoon    Case & plan discussed with Dr. YVETTE Adrian and patient's nurse.

## 2024-04-08 NOTE — CARE COORDINATION ASSESSMENT. - NSDCPLANSERVICES_GEN_ALL_CORE
34 y/o WF with no PMHx s/p laparoscopic appendectomy on 4/7/2024. CM met with patient at bedside.  Patient. A&O x 4.  CM explained role of CM and availability to assist with discharge planning throughout stay.   Provided CM contact information and pt. verbalized understanding. Patient identified her  as her caregiver at home who will assist her during her recuperation and will transport her home and to MD appointments. No needs identified at this time. Patient is self directing.  pcp: Laura Peterson   [facility] [phone]/No Anticipated Discharge Needs

## 2024-04-08 NOTE — DISCHARGE NOTE NURSING/CASE MANAGEMENT/SOCIAL WORK - NSDCVIVACCINE_GEN_ALL_CORE_FT
Tdap; 15-Nov-2016 13:31; Lorin Ernandez (GYPSY); [employer]; [id number]; IntraMuscular; Deltoid Left.; 0.5 milliLiter(s); VIS (VIS Published: 09-May-2013, VIS Presented: 15-Nov-2016);

## 2024-04-08 NOTE — PATIENT PROFILE ADULT - FALL HARM RISK - RISK INTERVENTIONS
Assistance OOB with selected safe patient handling equipment/Assistance with ambulation/Communicate Fall Risk and Risk Factors to all staff, patient, and family/Monitor gait and stability/Reinforce activity limits and safety measures with patient and family/Sit up slowly, dangle for a short time, stand at bedside before walking/Use of alarms - bed, chair and/or voice tab/Visual Cue: Yellow wristband/Bed in lowest position, wheels locked, appropriate side rails in place/Call bell, personal items and telephone in reach/Instruct patient to call for assistance before getting out of bed or chair/Non-slip footwear when patient is out of bed/[place] to call system/Physically safe environment - no spills, clutter or unnecessary equipment/Purposeful Proactive Rounding/Room/bathroom lighting operational, light cord in reach

## 2024-04-08 NOTE — DISCHARGE NOTE NURSING/CASE MANAGEMENT/SOCIAL WORK - NSDCPEFALRISK_GEN_ALL_CORE
For information on Fall & Injury Prevention, visit: [email or web address] OR  [email or web address] OR  https://www.cdc.gov/steadi/patient.html

## 2024-04-08 NOTE — DISCHARGE NOTE NURSING/CASE MANAGEMENT/SOCIAL WORK - PATIENT PORTAL LINK FT
You can access the FollowMyHealth Patient Portal offered by [facility] by registering at the following website: [email or web address]. By joining [employer]’s FollowMyHealth portal, you will also be able to view your health information using other applications (apps) compatible with our system.

## 2024-04-08 NOTE — BRIEF OPERATIVE NOTE - NSICDXBRIEFPREOP_GEN_ALL_CORE_FT
PRE-OP DIAGNOSIS:  Acute appendicitis with localized peritonitis 08-Apr-2024 00:19:22  Zohaib Adrian

## 2024-04-08 NOTE — BRIEF OPERATIVE NOTE - NSICDXBRIEFPOSTOP_GEN_ALL_CORE_FT
POST-OP DIAGNOSIS:  Acute appendicitis with localized peritonitis 08-Apr-2024 00:19:29  Zohaib Adrian

## 2024-04-08 NOTE — PROGRESS NOTE ADULT - SUBJECTIVE AND OBJECTIVE BOX
SURGERY PA NOTE - POD#1    32 y/o WF with no PMHx s/p laparoscopic appendectomy on 4/7/2024    SUBJECTIVE:  Patient seen at beside, no overnight events, no complaints at this time.  Reports pain is well-controlled.  Patient admits to tolerating diet, flatus, no bowel movement, voiding independently, ambulating.  Patient denies chest pain, shortness of breath, headache, dizziness, fever/chills, dysuria, nausea, vomiting, diarrhea.    OBJECTIVE:   T(F): 97.7 (04-08-24 @ 08:38), Max: 98.7 (04-07-24 @ 19:39)  HR: 80 (04-08-24 @ 09:10) (52 - 92)  BP: 101/61 (04-08-24 @ 09:10) (92/40 - 119/70)  RR: 14 (04-08-24 @ 08:38) (10 - 18)  SpO2: 98% (04-08-24 @ 08:38) (95% - 100%)      I&O's Detail  07 Apr 2024 07:01  -  08 Apr 2024 07:00  --------------------------------------------------------  IN:    Lactated Ringers: 1100 mL  Total IN: 1100 mL    OUT:    Blood Loss (mL): 5 mL  Total OUT: 5 mL  Total NET: 1095 mL      PHYSICAL EXAM:  General: A+O x 3, NAD  HEENT: PERRLA, EOMs intact, non-icteric  Abdomen: soft, mildly distended, mild incisional tenderness, BS x 4, no guarding, no rebound, incisions clean with Dermbond intact  Extremities: nonedematous bilaterally, warm, no calf tenderness noted     MEDICATIONS  (STANDING):  acetaminophen   IVPB .. 1000 milliGRAM(s) IV Intermittent every 6 hours  lactated ringers. 1000 milliLiter(s) (100 mL/Hr) IV Continuous <Continuous>  piperacillin/tazobactam IVPB.. 3.375 Gram(s) IV Intermittent every 8 hours    MEDICATIONS  (PRN):  HYDROmorphone  Injectable 0.2 milliGRAM(s) IV Push every 4 hours PRN Severe Pain (7 - 10)  oxycodone    5 mG/acetaminophen 325 mG 1 Tablet(s) Oral every 4 hours PRN Moderate Pain (4 - 6)  oxycodone    5 mG/acetaminophen 325 mG 2 Tablet(s) Oral every 6 hours PRN Severe Pain (7 - 10)      LABS:                        11.4   11.60 )-----------( 249      ( 08 Apr 2024 09:07 )             34.0     04-08    141  |  107  |  6<L>  ----------------------------<  139<H>  3.8   |  26  |  0.71    Ca    8.4      08 Apr 2024 09:07    TPro  6.4  /  Alb  3.4  /  TBili  1.2  /  DBili  x   /  AST  16  /  ALT  14  /  AlkPhos  50  04-07    PT/INR - ( 07 Apr 2024 09:06 )   PT: 11.5 sec;   INR: 1.06 ratio         PTT - ( 07 Apr 2024 09:06 )  PTT:30.7 sec  Urinalysis Basic - ( 08 Apr 2024 09:07 )    Color: x / Appearance: x / SG: x / pH: x  Gluc: 139 mg/dL / Ketone: x  / Bili: x / Urobili: x   Blood: x / Protein: x / Nitrite: x   Leuk Esterase: x / RBC: x / WBC x   Sq Epi: x / Non Sq Epi: x / Bacteria: x

## 2024-04-08 NOTE — CARE COORDINATION ASSESSMENT. - NSCAREPROVIDERS_GEN_ALL_CORE_FT
CARE PROVIDERS:  Accepting Physician: Zohaib Adrian  Administration: Anders Beyer  Admitting: Zohaib Adrian  Attending: Zohaib Adrian  Consultant: Sonia Golden ED Attending: Clay Wallace  ED Nurse: Varinder Ferguson  Infection Control: Yadira Srinivasan  Nurse: Kassandra Downing  Nurse: Vero Taylor  Nurse: Monique Salter  Nurse: Asmita Feldman  Nurse: Janeen Kat  Nurse: Vannesa Boswell  Nurse: Abel Moreno  Nurse: Bubba Nguyen  Override: Kassandra Downing  PCA/Nursing Assistant: Pennie Thomsa  PCA/Nursing Assistant: George Galarza  Primary Team: Penny Tomlinson  Primary Team: Lula Navarrete  Primary Team: Zohaib Adrian  Registered Dietitian: Brandy Braun  : Verónica Ryan

## 2024-04-09 LAB — SURGICAL PATHOLOGY STUDY: SIGNIFICANT CHANGE UP

## 2024-04-30 ENCOUNTER — NON-APPOINTMENT (OUTPATIENT)
Age: 34
End: 2024-04-30

## 2024-09-29 NOTE — H&P ADULT - ASSESSMENT
CT abdomen pelvis  IMPRESSION:  Acute appendicitis, without perforation or abscess. Surgical consultation   recommended.    Heterogeneous appearance of the uterus with cystic changes in the   To OR for Lap appendectomy Dr. Adrian  NPO  IVF  IVABX
gait, locomotion, and balance/gross motor/muscle strength

## 2024-11-11 ENCOUNTER — APPOINTMENT (OUTPATIENT)
Dept: UROGYNECOLOGY | Facility: CLINIC | Age: 34
End: 2024-11-11

## 2024-11-11 VITALS
BODY MASS INDEX: 27.6 KG/M2 | SYSTOLIC BLOOD PRESSURE: 116 MMHG | OXYGEN SATURATION: 99 % | TEMPERATURE: 97.6 F | HEIGHT: 62 IN | HEART RATE: 73 BPM | WEIGHT: 150 LBS | DIASTOLIC BLOOD PRESSURE: 72 MMHG

## 2024-11-11 DIAGNOSIS — R35.0 FREQUENCY OF MICTURITION: ICD-10-CM

## 2024-11-11 DIAGNOSIS — N39.3 STRESS INCONTINENCE (FEMALE) (MALE): ICD-10-CM

## 2024-11-11 DIAGNOSIS — R31.9 HEMATURIA, UNSPECIFIED: ICD-10-CM

## 2024-11-11 DIAGNOSIS — R35.1 NOCTURIA: ICD-10-CM

## 2024-11-11 PROCEDURE — 51701 INSERT BLADDER CATHETER: CPT | Mod: 59

## 2024-11-11 PROCEDURE — 99204 OFFICE O/P NEW MOD 45 MIN: CPT | Mod: 25

## 2024-11-11 PROCEDURE — 99459 PELVIC EXAMINATION: CPT

## 2024-11-12 LAB
APPEARANCE: CLEAR
BACTERIA: NEGATIVE /HPF
BILIRUBIN URINE: NEGATIVE
BLOOD URINE: NEGATIVE
CAST: 1 /LPF
COLOR: YELLOW
EPITHELIAL CELLS: 2 /HPF
GLUCOSE QUALITATIVE U: NEGATIVE MG/DL
KETONES URINE: NEGATIVE MG/DL
LEUKOCYTE ESTERASE URINE: NEGATIVE
MICROSCOPIC-UA: NORMAL
NITRITE URINE: NEGATIVE
PH URINE: 7
PROTEIN URINE: NEGATIVE MG/DL
RED BLOOD CELLS URINE: 0 /HPF
SPECIFIC GRAVITY URINE: 1.01
UROBILINOGEN URINE: 0.2 MG/DL
WHITE BLOOD CELLS URINE: 0 /HPF

## 2024-11-13 LAB — BACTERIA UR CULT: NORMAL

## 2024-11-21 ENCOUNTER — APPOINTMENT (OUTPATIENT)
Dept: UROGYNECOLOGY | Facility: CLINIC | Age: 34
End: 2024-11-21

## 2024-12-01 ENCOUNTER — NON-APPOINTMENT (OUTPATIENT)
Age: 34
End: 2024-12-01

## 2024-12-14 ENCOUNTER — NON-APPOINTMENT (OUTPATIENT)
Age: 34
End: 2024-12-14

## 2025-01-07 NOTE — ED ADULT TRIAGE NOTE - INTERNATIONAL TRAVEL
Spoke to patient with Dr and  services. Patient denies SI HI AVH at present, Patient cooperative and pleasant at present. Patient requesting shoes, aware that he can not have shoes related to past attempts to kick staff. Patient complaint with medication administration   No

## 2025-07-11 NOTE — ED PROVIDER NOTE - PATIENT PORTAL LINK FT
Yes You can access the FollowMyHealth Patient Portal offered by [facility] by registering at the following website: [email or web address]. By joining [facility]’s FollowMyHealth portal, you will also be able to view your health information using other applications (apps) compatible with our system.

## 2025-07-15 ENCOUNTER — NON-APPOINTMENT (OUTPATIENT)
Age: 35
End: 2025-07-15

## 2025-09-12 ENCOUNTER — APPOINTMENT (OUTPATIENT)
Age: 35
End: 2025-09-12

## 2025-09-12 DIAGNOSIS — M25.571 PAIN IN RIGHT ANKLE AND JOINTS OF RIGHT FOOT: ICD-10-CM

## 2025-09-12 DIAGNOSIS — M77.41 METATARSALGIA, RIGHT FOOT: ICD-10-CM

## 2025-09-12 DIAGNOSIS — M25.572 PAIN IN LEFT ANKLE AND JOINTS OF LEFT FOOT: ICD-10-CM

## 2025-09-12 DIAGNOSIS — M65.871 OTHER SYNOVITIS AND TENOSYNOVITIS, RIGHT ANKLE AND FOOT: ICD-10-CM

## 2025-09-12 DIAGNOSIS — M71.571 OTHER BURSITIS, NOT ELSEWHERE CLASSIFIED, RIGHT ANKLE AND FOOT: ICD-10-CM

## 2025-09-12 PROCEDURE — 76882 US LMTD JT/FCL EVL NVASC XTR: CPT | Mod: RT

## 2025-09-12 PROCEDURE — 73630 X-RAY EXAM OF FOOT: CPT | Mod: 50

## 2025-09-12 PROCEDURE — 99203 OFFICE O/P NEW LOW 30 MIN: CPT

## 2025-09-12 RX ORDER — MELOXICAM 15 MG/1
15 TABLET ORAL DAILY
Qty: 14 | Refills: 0 | Status: ACTIVE | COMMUNITY
Start: 2025-09-12 | End: 1900-01-01

## 2025-09-18 ENCOUNTER — APPOINTMENT (OUTPATIENT)
Dept: MRI IMAGING | Facility: CLINIC | Age: 35
End: 2025-09-18

## (undated) DEVICE — Device

## (undated) DEVICE — PACK GENERAL LAPAROSCOPY

## (undated) DEVICE — SUT MONOCRYL 4-0 27" PS-2 UNDYED

## (undated) DEVICE — BLADE SCALPEL SAFETYLOCK #15

## (undated) DEVICE — WARMING BLANKET UPPER ADULT

## (undated) DEVICE — TROCAR ETHICON ENDOPATH XCEL UNIVERSAL SLEEVE WITH OPTIVIEW 5MM X 100MM

## (undated) DEVICE — ENDOCATCH 10MM SPECIMEN POUCH

## (undated) DEVICE — D HELP - CLEARVIEW CLEARIFY SYSTEM

## (undated) DEVICE — DRAPE 3/4 SHEET W REINFORCEMENT 56X77"

## (undated) DEVICE — DRSG MASTISOL

## (undated) DEVICE — TUBING STRYKER PNEUMOSURE HEATED RTP

## (undated) DEVICE — GLV 7.5 PROTEXIS (WHITE)

## (undated) DEVICE — VENODYNE/SCD SLEEVE CALF MEDIUM

## (undated) DEVICE — VENODYNE/SCD SLEEVE CALF BARIATRIC

## (undated) DEVICE — SPONGE PEANUT AUTO COUNT

## (undated) DEVICE — VENODYNE/SCD SLEEVE CALF LARGE

## (undated) DEVICE — SUT POLYSORB 0 30" GU-46

## (undated) DEVICE — ELCTR BOVIE TIP BLADE INSULATED 2.75" EDGE

## (undated) DEVICE — DRAPE TOWEL BLUE 17" X 24"

## (undated) DEVICE — SMOKE EVACUTATION SYS LAPROSCOPIC AC/PA

## (undated) DEVICE — STAPLER COVIDIEN ENDO GIA STANDARD HANDLE

## (undated) DEVICE — TROCAR COVIDIEN VERSAONE BLUNT TIP HASSAN 12MM

## (undated) DEVICE — LIGASURE MARYLAND 37CM

## (undated) DEVICE — SOL IRR BAG NS 0.9% 3000ML

## (undated) DEVICE — ELCTR STRYKER NEPTUNE SMOKE EVACUATION PENCIL (GREEN)

## (undated) DEVICE — TROCAR ETHICON ENDOPATH XCEL BLADELESS 5MM X 100MM STABILITY

## (undated) DEVICE — DRSG CURITY GAUZE SPONGE 4 X 4" 12-PLY